# Patient Record
Sex: FEMALE | Race: WHITE
[De-identification: names, ages, dates, MRNs, and addresses within clinical notes are randomized per-mention and may not be internally consistent; named-entity substitution may affect disease eponyms.]

---
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
## 2023-01-06 ENCOUNTER — OFFICE VISIT (OUTPATIENT)
Dept: FAMILY MEDICINE CLINIC | Facility: CLINIC | Age: 18
End: 2023-01-06

## 2023-01-06 VITALS
WEIGHT: 103.6 LBS | TEMPERATURE: 98.3 F | DIASTOLIC BLOOD PRESSURE: 78 MMHG | OXYGEN SATURATION: 90 % | RESPIRATION RATE: 16 BRPM | HEART RATE: 76 BPM | SYSTOLIC BLOOD PRESSURE: 117 MMHG | BODY MASS INDEX: 17.4 KG/M2

## 2023-01-06 DIAGNOSIS — F32.A DEPRESSION, UNSPECIFIED DEPRESSION TYPE: ICD-10-CM

## 2023-01-06 DIAGNOSIS — N92.6 IRREGULAR MENSTRUAL CYCLE: ICD-10-CM

## 2023-01-06 DIAGNOSIS — R63.4 WEIGHT LOSS: Primary | ICD-10-CM

## 2023-01-06 DIAGNOSIS — R11.0 NAUSEA: ICD-10-CM

## 2023-01-06 RX ORDER — SERTRALINE HYDROCHLORIDE 25 MG/1
25 TABLET, FILM COATED ORAL DAILY
Qty: 90 TABLET | Refills: 3 | Status: ON HOLD | OUTPATIENT
Start: 2023-01-06

## 2023-01-06 RX ORDER — SERTRALINE HYDROCHLORIDE 25 MG/1
25 TABLET, FILM COATED ORAL DAILY
Qty: 90 TABLET | Refills: 3 | Status: SHIPPED | OUTPATIENT
Start: 2023-01-06 | End: 2023-01-06

## 2023-01-06 RX ORDER — ONDANSETRON 4 MG/1
4 TABLET, FILM COATED ORAL EVERY 8 HOURS PRN
Qty: 20 TABLET | Refills: 0 | Status: ON HOLD | OUTPATIENT
Start: 2023-01-06

## 2023-01-06 NOTE — PROGRESS NOTES
Name: Brady Anand      : 2005      MRN: [id number]  Encounter Provider: Rain Nunez DO  Encounter Date: 2023   Encounter department: [address] [place] [address]  Weight loss  Assessment & Plan:  Wt Readings from Last 3 Encounters:   23 47 kg (103 lb 9 6 oz) (7 %, Z= -1 48)*   22 53 kg (116 lb 12 8 oz) (30 %, Z= -0 54)*   22 54 kg (119 lb) (37 %, Z= -0 33)*     * Growth percentiles are based on [facility] (Girls, 2-20 Years) data  15yo female presenting for follow up regarding weight loss  Pt has lost weight since last visit  Small portions eaten per mom, nausea with eating  Reports increased stress lately  Plan:  - Will order medical workup to rule out medical cause - CBC, TSH, CXR  - Will refer to nutrition services  - Will prescribe trial of zofran to assist with nausea  - Recommend protein shakes, full-fat options  - F/u 2 weeks for weight check    Orders:  -     CBC and differential; Future  -     XR chest pa & lateral; Future; Expected date: 2023  -     Ambulatory Referral to Nutrition Services; Future    2  Depression, unspecified depression type  Assessment & Plan:  15yo female presenting for f/u visit regarding weight loss  Of note, pt has not been taking her Lexapro for depression  Did not establish for behavioral therapy  Plan:  - Will switch to zoloft, f/u in 2 weeks  - Will contact Dr Skye Mendez for scheduling for behavioral therapy    Orders:  -     sertraline (Zoloft) 25 mg tablet; Take 1 tablet (25 mg total) by mouth daily    3  Irregular menstrual cycle  Assessment & Plan:  15yo female presenting for f/u visit regarding irregular menstrual periods  Heavy menstrual bleeding 2-3 weeks with 3 days in between    - Nexplanon placed 2022 with no regulation of cycles  - Transvaginal ultrasound unremarkable    Plan:  - Will order CBC  - Will refer to endocrinology for a specialty evaluation  - F/u 2 weeks    Orders:  -     Ambulatory Referral to Endocrinology; Future    4  Nausea  -     ondansetron (ZOFRAN) 4 mg tablet; Take 1 tablet (4 mg total) by mouth every 8 (eight) hours as needed for nausea or vomiting         Subjective      18yo female presents for follow up visit with Mom via Facetime  Weight loss  - Mom reports pt has lost weight since her last visit  - Reports poor appetite or small portions when she does eat, reports she becomes nauseous with eating  - Pt reports keeping hydrated  - Pt admits to associated stress - boyfriend moved out of her house  - Pt reports she is upset that she cannot get a job  Reports cannot stand for long due to dizziness  Reports she has passed out in the past     Depression  - Recent stress as stated above  - Has not been taking her Lexapro, feels like she is numb and cannot feel happy       Review of Systems   Constitutional: Positive for appetite change and unexpected weight change  Negative for chills and fever  HENT: Negative for congestion  Eyes: Negative for visual disturbance  Respiratory: Negative for shortness of breath  Cardiovascular: Negative for chest pain and palpitations  Gastrointestinal: Positive for nausea  Negative for abdominal pain and vomiting  Allergic/Immunologic: Negative for environmental allergies and food allergies  Neurological: Positive for dizziness, syncope and light-headedness  Negative for weakness and headaches  Psychiatric/Behavioral: Negative for sleep disturbance  Current Outpatient Medications on File Prior to Visit   Medication Sig   • [DISCONTINUED] escitalopram (LEXAPRO) 10 mg tablet Take 10 mg by mouth daily (Patient not taking: Reported on 1/6/2023)       Objective     /78   Pulse 76   Temp 98 3 °F (36 8 °C)   Resp 16   Wt 47 kg (103 lb 9 6 oz)   SpO2 90%   BMI 17 40 kg/m²     Physical Exam  Constitutional:       General: She is not in acute distress  Appearance: She is ill-appearing  Comments: Pt appears underweight   HENT:      Head: Normocephalic and atraumatic  Cardiovascular:      Rate and Rhythm: Normal rate and regular rhythm  Heart sounds: Normal heart sounds  Pulmonary:      Effort: Pulmonary effort is normal       Breath sounds: Normal breath sounds  No wheezing or rales  Abdominal:      General: Bowel sounds are normal       Palpations: Abdomen is soft  Tenderness: There is no abdominal tenderness  There is no guarding or rebound  Musculoskeletal:         General: No swelling or deformity  Skin:     General: Skin is warm and dry     Psychiatric:         Mood and Affect: Mood normal          Behavior: Behavior normal           Farzaneh Guerra DO

## 2023-01-07 NOTE — ASSESSMENT & PLAN NOTE
Wt Readings from Last 3 Encounters:   01/06/23 47 kg (103 lb 9 6 oz) (7 %, Z= -1 48)*   11/04/22 53 kg (116 lb 12 8 oz) (30 %, Z= -0 54)*   03/23/22 54 kg (119 lb) (37 %, Z= -0 33)*     * Growth percentiles are based on [facility] (Girls, 2-20 Years) data  15yo female presenting for follow up regarding weight loss  Pt has lost weight since last visit  Small portions eaten per mom, nausea with eating  Reports increased stress lately      Plan:  - Will order medical workup to rule out medical cause - CBC, TSH, CXR  - Will refer to nutrition services  - Will prescribe trial of zofran to assist with nausea  - Recommend protein shakes, full-fat options  - F/u 2 weeks for weight check

## 2023-01-07 NOTE — ASSESSMENT & PLAN NOTE
17yo female presenting for f/u visit regarding weight loss  Of note, pt has not been taking her Lexapro for depression  Did not establish for behavioral therapy      Plan:  - Will switch to zoloft, f/u in 2 weeks  - Will contact Dr Thad Dean for scheduling for behavioral therapy

## 2023-01-07 NOTE — ASSESSMENT & PLAN NOTE
17yo female presenting for f/u visit regarding irregular menstrual periods  Heavy menstrual bleeding 2-3 weeks with 3 days in between    - Nexplanon placed 1/2022 with no regulation of cycles  - Transvaginal ultrasound unremarkable    Plan:  - Will order CBC  - Will refer to endocrinology for a specialty evaluation  - F/u 2 weeks

## 2023-01-14 ENCOUNTER — HOSPITAL ENCOUNTER (INPATIENT)
Facility: HOSPITAL | Age: 18
LOS: 4 days | Discharge: HOME | End: 2023-01-18
Attending: STUDENT IN AN ORGANIZED HEALTH CARE EDUCATION/TRAINING PROGRAM | Admitting: STUDENT IN AN ORGANIZED HEALTH CARE EDUCATION/TRAINING PROGRAM

## 2023-01-14 ENCOUNTER — HOSPITAL ENCOUNTER (EMERGENCY)
Facility: HOSPITAL | Age: 18
End: 2023-01-14
Attending: EMERGENCY MEDICINE

## 2023-01-14 VITALS
TEMPERATURE: 98.2 F | DIASTOLIC BLOOD PRESSURE: 65 MMHG | HEART RATE: 78 BPM | SYSTOLIC BLOOD PRESSURE: 102 MMHG | OXYGEN SATURATION: 99 % | RESPIRATION RATE: 18 BRPM

## 2023-01-14 DIAGNOSIS — F32.9 MDD (MAJOR DEPRESSIVE DISORDER): Primary | ICD-10-CM

## 2023-01-14 DIAGNOSIS — F32.9 MDD (MAJOR DEPRESSIVE DISORDER): ICD-10-CM

## 2023-01-14 DIAGNOSIS — F32.A DEPRESSION, UNSPECIFIED DEPRESSION TYPE: ICD-10-CM

## 2023-01-14 DIAGNOSIS — F33.2 MDD (MAJOR DEPRESSIVE DISORDER), RECURRENT EPISODE, SEVERE (HCC): Primary | ICD-10-CM

## 2023-01-14 DIAGNOSIS — R82.90 ABNORMAL URINALYSIS: ICD-10-CM

## 2023-01-14 LAB
AMPHETAMINES SERPL QL SCN: NEGATIVE
BARBITURATES UR QL: NEGATIVE
BENZODIAZ UR QL: NEGATIVE
COCAINE UR QL: NEGATIVE
ETHANOL EXG-MCNC: 0 MG/DL
EXT PREGNANCY TEST URINE: NEGATIVE
EXT. CONTROL: NORMAL
FLUAV RNA RESP QL NAA+PROBE: NEGATIVE
FLUBV RNA RESP QL NAA+PROBE: NEGATIVE
METHADONE UR QL: NEGATIVE
OPIATES UR QL SCN: NEGATIVE
OXYCODONE+OXYMORPHONE UR QL SCN: NEGATIVE
PCP UR QL: NEGATIVE
RSV RNA RESP QL NAA+PROBE: NEGATIVE
SARS-COV-2 RNA RESP QL NAA+PROBE: NEGATIVE
THC UR QL: POSITIVE

## 2023-01-14 RX ORDER — OLANZAPINE 5 MG/1
5 TABLET ORAL
Status: DISCONTINUED | OUTPATIENT
Start: 2023-01-14 | End: 2023-01-18 | Stop reason: HOSPADM

## 2023-01-14 RX ORDER — BENZTROPINE MESYLATE 1 MG/ML
1 INJECTION INTRAMUSCULAR; INTRAVENOUS
Status: DISCONTINUED | OUTPATIENT
Start: 2023-01-14 | End: 2023-01-18 | Stop reason: HOSPADM

## 2023-01-14 RX ORDER — OLANZAPINE 2.5 MG/1
2.5 TABLET ORAL
Status: CANCELLED | OUTPATIENT
Start: 2023-01-14

## 2023-01-14 RX ORDER — ACETAMINOPHEN 325 MG/1
975 TABLET ORAL EVERY 6 HOURS PRN
Status: DISCONTINUED | OUTPATIENT
Start: 2023-01-14 | End: 2023-01-18 | Stop reason: HOSPADM

## 2023-01-14 RX ORDER — HYDROXYZINE 50 MG/1
100 TABLET, FILM COATED ORAL
Status: DISCONTINUED | OUTPATIENT
Start: 2023-01-14 | End: 2023-01-18 | Stop reason: HOSPADM

## 2023-01-14 RX ORDER — BISACODYL 10 MG
10 SUPPOSITORY, RECTAL RECTAL DAILY PRN
Status: CANCELLED | OUTPATIENT
Start: 2023-01-14

## 2023-01-14 RX ORDER — OLANZAPINE 2.5 MG/1
5 TABLET ORAL
Status: CANCELLED | OUTPATIENT
Start: 2023-01-14

## 2023-01-14 RX ORDER — BENZTROPINE MESYLATE 0.5 MG/1
1 TABLET ORAL
Status: CANCELLED | OUTPATIENT
Start: 2023-01-14

## 2023-01-14 RX ORDER — ACETAMINOPHEN 325 MG/1
650 TABLET ORAL EVERY 4 HOURS PRN
Status: CANCELLED | OUTPATIENT
Start: 2023-01-14

## 2023-01-14 RX ORDER — MAGNESIUM HYDROXIDE/ALUMINUM HYDROXICE/SIMETHICONE 120; 1200; 1200 MG/30ML; MG/30ML; MG/30ML
30 SUSPENSION ORAL EVERY 4 HOURS PRN
Status: DISCONTINUED | OUTPATIENT
Start: 2023-01-14 | End: 2023-01-18 | Stop reason: HOSPADM

## 2023-01-14 RX ORDER — ACETAMINOPHEN 325 MG/1
650 TABLET ORAL EVERY 6 HOURS PRN
Status: CANCELLED | OUTPATIENT
Start: 2023-01-14

## 2023-01-14 RX ORDER — DIPHENHYDRAMINE HYDROCHLORIDE 50 MG/ML
50 INJECTION INTRAMUSCULAR; INTRAVENOUS EVERY 6 HOURS PRN
Status: CANCELLED | OUTPATIENT
Start: 2023-01-14

## 2023-01-14 RX ORDER — ACETAMINOPHEN 325 MG/1
650 TABLET ORAL EVERY 4 HOURS PRN
Status: DISCONTINUED | OUTPATIENT
Start: 2023-01-14 | End: 2023-01-18 | Stop reason: HOSPADM

## 2023-01-14 RX ORDER — HYDROXYZINE HYDROCHLORIDE 25 MG/1
50 TABLET, FILM COATED ORAL
Status: CANCELLED | OUTPATIENT
Start: 2023-01-14

## 2023-01-14 RX ORDER — POLYETHYLENE GLYCOL 3350 17 G/17G
17 POWDER, FOR SOLUTION ORAL DAILY PRN
Status: DISCONTINUED | OUTPATIENT
Start: 2023-01-14 | End: 2023-01-18 | Stop reason: HOSPADM

## 2023-01-14 RX ORDER — LORAZEPAM 2 MG/ML
2 INJECTION INTRAMUSCULAR EVERY 6 HOURS PRN
Status: CANCELLED | OUTPATIENT
Start: 2023-01-14

## 2023-01-14 RX ORDER — OLANZAPINE 10 MG/1
2.5 INJECTION, POWDER, LYOPHILIZED, FOR SOLUTION INTRAMUSCULAR
Status: DISCONTINUED | OUTPATIENT
Start: 2023-01-14 | End: 2023-01-18 | Stop reason: HOSPADM

## 2023-01-14 RX ORDER — ONDANSETRON 4 MG/1
4 TABLET, ORALLY DISINTEGRATING ORAL ONCE
Status: COMPLETED | OUTPATIENT
Start: 2023-01-14 | End: 2023-01-14

## 2023-01-14 RX ORDER — LANOLIN ALCOHOL/MO/W.PET/CERES
3 CREAM (GRAM) TOPICAL
Status: DISCONTINUED | OUTPATIENT
Start: 2023-01-14 | End: 2023-01-18 | Stop reason: HOSPADM

## 2023-01-14 RX ORDER — LORAZEPAM 2 MG/ML
2 INJECTION INTRAMUSCULAR EVERY 6 HOURS PRN
Status: DISCONTINUED | OUTPATIENT
Start: 2023-01-14 | End: 2023-01-18 | Stop reason: HOSPADM

## 2023-01-14 RX ORDER — MIRTAZAPINE 15 MG/1
7.5 TABLET, FILM COATED ORAL
Status: CANCELLED | OUTPATIENT
Start: 2023-01-14

## 2023-01-14 RX ORDER — ACETAMINOPHEN 325 MG/1
650 TABLET ORAL EVERY 6 HOURS PRN
Status: DISCONTINUED | OUTPATIENT
Start: 2023-01-14 | End: 2023-01-18 | Stop reason: HOSPADM

## 2023-01-14 RX ORDER — ACETAMINOPHEN 325 MG/1
975 TABLET ORAL EVERY 6 HOURS PRN
Status: CANCELLED | OUTPATIENT
Start: 2023-01-14

## 2023-01-14 RX ORDER — POLYETHYLENE GLYCOL 3350 17 G/17G
17 POWDER, FOR SOLUTION ORAL DAILY PRN
Status: CANCELLED | OUTPATIENT
Start: 2023-01-14

## 2023-01-14 RX ORDER — HYDROXYZINE HYDROCHLORIDE 25 MG/1
100 TABLET, FILM COATED ORAL
Status: CANCELLED | OUTPATIENT
Start: 2023-01-14

## 2023-01-14 RX ORDER — OLANZAPINE 10 MG/1
5 INJECTION, POWDER, LYOPHILIZED, FOR SOLUTION INTRAMUSCULAR
Status: DISCONTINUED | OUTPATIENT
Start: 2023-01-14 | End: 2023-01-18 | Stop reason: HOSPADM

## 2023-01-14 RX ORDER — OLANZAPINE 10 MG/1
2.5 INJECTION, POWDER, LYOPHILIZED, FOR SOLUTION INTRAMUSCULAR
Status: CANCELLED | OUTPATIENT
Start: 2023-01-14

## 2023-01-14 RX ORDER — LANOLIN ALCOHOL/MO/W.PET/CERES
3 CREAM (GRAM) TOPICAL
Status: CANCELLED | OUTPATIENT
Start: 2023-01-14

## 2023-01-14 RX ORDER — DIPHENHYDRAMINE HYDROCHLORIDE 50 MG/ML
50 INJECTION INTRAMUSCULAR; INTRAVENOUS EVERY 6 HOURS PRN
Status: DISCONTINUED | OUTPATIENT
Start: 2023-01-14 | End: 2023-01-18 | Stop reason: HOSPADM

## 2023-01-14 RX ORDER — OLANZAPINE 2.5 MG/1
2.5 TABLET ORAL
Status: DISCONTINUED | OUTPATIENT
Start: 2023-01-14 | End: 2023-01-18 | Stop reason: HOSPADM

## 2023-01-14 RX ORDER — PROPRANOLOL HYDROCHLORIDE 20 MG/1
10 TABLET ORAL EVERY 8 HOURS PRN
Status: CANCELLED | OUTPATIENT
Start: 2023-01-14

## 2023-01-14 RX ORDER — BISACODYL 10 MG
10 SUPPOSITORY, RECTAL RECTAL DAILY PRN
Status: DISCONTINUED | OUTPATIENT
Start: 2023-01-14 | End: 2023-01-18 | Stop reason: HOSPADM

## 2023-01-14 RX ORDER — BENZTROPINE MESYLATE 1 MG/ML
1 INJECTION INTRAMUSCULAR; INTRAVENOUS
Status: CANCELLED | OUTPATIENT
Start: 2023-01-14

## 2023-01-14 RX ORDER — TRAZODONE HYDROCHLORIDE 50 MG/1
50 TABLET ORAL
Status: DISCONTINUED | OUTPATIENT
Start: 2023-01-14 | End: 2023-01-14

## 2023-01-14 RX ORDER — OLANZAPINE 10 MG/1
5 INJECTION, POWDER, LYOPHILIZED, FOR SOLUTION INTRAMUSCULAR
Status: CANCELLED | OUTPATIENT
Start: 2023-01-14

## 2023-01-14 RX ORDER — HYDROXYZINE HYDROCHLORIDE 25 MG/1
25 TABLET, FILM COATED ORAL
Status: CANCELLED | OUTPATIENT
Start: 2023-01-14

## 2023-01-14 RX ORDER — AMOXICILLIN 250 MG
1 CAPSULE ORAL DAILY PRN
Status: CANCELLED | OUTPATIENT
Start: 2023-01-14

## 2023-01-14 RX ORDER — MAGNESIUM HYDROXIDE/ALUMINUM HYDROXICE/SIMETHICONE 120; 1200; 1200 MG/30ML; MG/30ML; MG/30ML
30 SUSPENSION ORAL EVERY 4 HOURS PRN
Status: CANCELLED | OUTPATIENT
Start: 2023-01-14

## 2023-01-14 RX ORDER — MIRTAZAPINE 15 MG/1
7.5 TABLET, FILM COATED ORAL
Status: DISCONTINUED | OUTPATIENT
Start: 2023-01-14 | End: 2023-01-15

## 2023-01-14 RX ORDER — HYDROXYZINE 50 MG/1
50 TABLET, FILM COATED ORAL
Status: DISCONTINUED | OUTPATIENT
Start: 2023-01-14 | End: 2023-01-18 | Stop reason: HOSPADM

## 2023-01-14 RX ORDER — BENZTROPINE MESYLATE 1 MG/1
1 TABLET ORAL
Status: DISCONTINUED | OUTPATIENT
Start: 2023-01-14 | End: 2023-01-18 | Stop reason: HOSPADM

## 2023-01-14 RX ORDER — PROPRANOLOL HYDROCHLORIDE 10 MG/1
10 TABLET ORAL EVERY 8 HOURS PRN
Status: DISCONTINUED | OUTPATIENT
Start: 2023-01-14 | End: 2023-01-18 | Stop reason: HOSPADM

## 2023-01-14 RX ORDER — AMOXICILLIN 250 MG
1 CAPSULE ORAL DAILY PRN
Status: DISCONTINUED | OUTPATIENT
Start: 2023-01-14 | End: 2023-01-18 | Stop reason: HOSPADM

## 2023-01-14 RX ORDER — HYDROXYZINE HYDROCHLORIDE 25 MG/1
25 TABLET, FILM COATED ORAL
Status: DISCONTINUED | OUTPATIENT
Start: 2023-01-14 | End: 2023-01-18 | Stop reason: HOSPADM

## 2023-01-14 RX ADMIN — ONDANSETRON 4 MG: 4 TABLET, ORALLY DISINTEGRATING ORAL at 02:16

## 2023-01-14 RX ADMIN — ONDANSETRON 4 MG: 4 TABLET, ORALLY DISINTEGRATING ORAL at 14:11

## 2023-01-14 RX ADMIN — Medication 3 MG: at 21:17
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
## 2023-01-14 NOTE — ED NOTES
Pts cell phone put in locker 20  All other belongings sent home with parents        Jasbir Shah RN  01/14/23 2307
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
## 2023-01-14 NOTE — NURSING NOTE
Patient arrived wearin pr  Panties, 1 Bra  1 pr  White Crocs    To Locker:  1 large Yellow/Orange blanket  1 large Stuffed Cow    Paper with Mom/Dad phone numbers given to patient

## 2023-01-14 NOTE — ED ATTENDING ATTESTATION
1/14/2023  I, Manjula Duffy MD, saw and evaluated the patient  I have discussed the patient with the resident/non-physician practitioner and agree with the resident's/non-physician practitioner's findings, Plan of Care, and MDM as documented in the resident's/non-physician practitioner's note, except where noted  All available labs and Radiology studies were reviewed  I was present for key portions of any procedure(s) performed by the resident/non-physician practitioner and I was immediately available to provide assistance  At this point I agree with the current assessment done in the Emergency Department  I have conducted an independent evaluation of this patient a history and physical is as follows:    ED Course         Critical Care Time  Procedures    Patient is a pleasant 21 yof who presents with si  No HI  No focal neuro symptoms  No chest pain or sob  Notes a history of psychiatric issues in the past      MDM pleasant 21 yof, SI, will admit

## 2023-01-14 NOTE — NURSING NOTE
Pt is a 89 from [facility] with depression, anxiety, racing thoughts and SI  Pt recently started on Zoloft and Zofran, prescribed by PCP 4 days PTA  Poor sleep, appetite, increased nausea with ~20lb weight loss over the last 3 months  Pt denies any hx of IP treatment or SA  Currently denies SI, reports elevated anxiety related to 1st IP admission  Discussed unit expectations, schedule, treatment team members

## 2023-01-14 NOTE — ED NOTES
Patient is accepted at [facility]  Patient is accepted by Dr oCnor Del Toro per [address]  [facility] is arranged with [facility]  Transportation is scheduled for [phone]  Patient may go to the floor upon arrival        Nurse report is to be called to [phone] prior to patient transfer
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
## 2023-01-14 NOTE — PLAN OF CARE
RN will meet with pt at least twice per day to assess for any concerns  Teach about prescribed medications and diagnosis  Pt will be taught and encouraged to utilize healthy coping skills

## 2023-01-14 NOTE — LETTER
[address] [address]  Dept: [phone]      EMTALA TRANSFER CONSENT    NAME Kika Ro                                         2005                              MRN [id number]    I have been informed of my rights regarding examination, treatment, and transfer   by Dr Camilo Varela MD    Benefits: Specialized equipment and/or services available at the receiving facility (Include comment)________________________ [facility])    Risks: Potential for delay in receiving treatment      Transfer Request   I acknowledge that my medical condition has been evaluated and explained to me by the emergency department physician or other qualified medical person and/or my attending physician who has recommended and offered to me further medical examination and treatment  I understand the Hospital's obligation with respect to the treatment and stabilization of my emergency medical condition  I nevertheless request to be transferred  I release the Hospital, the doctor, and any other persons caring for me from all responsibility or liability for any injury or ill effects that may result from my transfer and agree to accept all responsibility for the consequences of my choice to transfer, rather than receive stabilizing treatment at the Hospital  I understand that because the transfer is my request, my insurance may not provide reimbursement for the services  The Hospital will assist and direct me and my family in how to make arrangements for transfer, but the hospital is not liable for any fees charged by the transport service  In spite of this understanding, I refuse to consent to further medical examination and treatment which has been offered to me, and request transfer to  [address] Name, [facility] & [place] : [place]   I authorize the performance of emergency medical procedures and treatments upon me in both transit and upon arrival at the receiving facility  Additionally, I authorize the release of any and all medical records to the receiving facility and request they be transported with me, if possible  I authorize the performance of emergency medical procedures and treatments upon me in both transit and upon arrival at the receiving facility  Additionally, I authorize the release of any and all medical records to the receiving facility and request they be transported with me, if possible  I understand that the safest mode of transportation during a medical emergency is an ambulance and that the Hospital advocates the use of this mode of transport  Risks of traveling to the receiving facility by car, including absence of medical control, life sustaining equipment, such as oxygen, and medical personnel has been explained to me and I fully understand them  (KAM CORRECT BOX BELOW)  [  ]  I consent to the stated transfer and to be transported by ambulance/helicopter  [  ]  I consent to the stated transfer, but refuse transportation by ambulance and accept full responsibility for my transportation by car  I understand the risks of non-ambulance transfers and I exonerate the Hospital and its staff from any deterioration in my condition that results from this refusal     X___________________________________________    DATE  23  TIME________  Signature of patient or legally responsible individual signing on patient behalf           RELATIONSHIP TO PATIENT_________________________          Provider Certification    NAME Pastor Fernandes                                        SH[place] 2005                              MRN [id number]    A medical screening exam was performed on the above named patient  Based on the examination:    Condition Necessitating Transfer The encounter diagnosis was MDD (major depressive disorder)      Patient Condition: The patient has been stabilized such that within reasonable medical probability, no material deterioration of the patient condition or the condition of the unborn child(enio) is likely to result from the transfer    Reason for Transfer: Level of Care needed not available at this facility (Inpatient Psych)    Transfer Requirements: [facility] [place] [facility]   · Space available and qualified personnel available for treatment as acknowledged by [phone]  · Agreed to accept transfer and to provide appropriate medical treatment as acknowledged by       Dr Yancy Dickson  · Appropriate medical records of the examination and treatment of the patient are provided at the time of transfer   [address] [facility], [address] _______  · Transfer will be performed by qualified personnel from [address] [place] [address]  and appropriate transfer equipment as required, including the use of necessary and appropriate life support measures  Provider Certification: I have examined the patient and explained the following risks and benefits of being transferred/refusing transfer to the patient/family:  General risk, such as traffic hazards, adverse weather conditions, rough terrain or turbulence, possible failure of equipment (including vehicle or aircraft), or consequences of actions of persons outside the control of the transport personnel      Based on these reasonable risks and benefits to the patient and/or the unborn child(enio), and based upon the information available at the time of the patient’s examination, I certify that the medical benefits reasonably to be expected from the provision of appropriate medical treatments at another medical facility outweigh the increasing risks, if any, to the individual’s medical condition, and in the case of labor to the unborn child, from effecting the transfer      X____________________________________________ DATE 01/14/23        TIME_______      ORIGINAL - SEND TO MEDICAL RECORDS   COPY - SEND WITH PATIENT DURING TRANSFER

## 2023-01-14 NOTE — ED NOTES
Lunch tray ordered for pt  Pt aware of signed 61 51 81, and aware of process finding appropriate bed  Other comfort needs offered, declined at this time  Claudia Headings EDT on 1:1, parents at bedside         Pravin Keating RN  01/14/23 2752
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
## 2023-01-14 NOTE — ED ATTENDING ATTESTATION
1/14/2023  I, Fabrizio Talavera MD, saw and evaluated the patient  I have discussed the patient with the resident/non-physician practitioner and agree with the resident's/non-physician practitioner's findings, Plan of Care, and MDM as documented in the resident's/non-physician practitioner's note, except where noted  All available labs and Radiology studies were reviewed  I was present for key portions of any procedure(s) performed by the resident/non-physician practitioner and I was immediately available to provide assistance  At this point I agree with the current assessment done in the Emergency Department  I have conducted an independent evaluation of this patient a history and physical is as follows:    Patient has been medically cleared by previous team and is awaiting crisis evaluation  Patient seen and evaluated by crisis worker, 12 signed  Patient accepted at [facility]      ED Course         Critical Care Time  Procedures

## 2023-01-15 PROBLEM — F41.9 ANXIETY: Status: ACTIVE | Noted: 2023-01-15

## 2023-01-15 PROBLEM — R82.90 ABNORMAL URINALYSIS: Status: ACTIVE | Noted: 2023-01-15

## 2023-01-15 PROBLEM — F33.2 MDD (MAJOR DEPRESSIVE DISORDER), RECURRENT EPISODE, SEVERE (HCC): Status: ACTIVE | Noted: 2022-11-04

## 2023-01-15 PROBLEM — F40.10 SOCIAL ANXIETY DISORDER: Status: ACTIVE | Noted: 2023-01-15

## 2023-01-15 PROBLEM — Z00.8 MEDICAL CLEARANCE FOR PSYCHIATRIC ADMISSION: Status: ACTIVE | Noted: 2023-01-15

## 2023-01-15 LAB
25(OH)D3 SERPL-MCNC: 10.9 NG/ML (ref 30–100)
ALBUMIN SERPL BCP-MCNC: 3.9 G/DL (ref 3.5–5)
ALP SERPL-CCNC: 76 U/L (ref 46–384)
ALT SERPL W P-5'-P-CCNC: 16 U/L (ref 12–78)
ANION GAP SERPL CALCULATED.3IONS-SCNC: 6 MMOL/L (ref 4–13)
AST SERPL W P-5'-P-CCNC: 12 U/L (ref 5–45)
ATRIAL RATE: 55 BPM
BACTERIA UR QL AUTO: ABNORMAL /HPF
BASOPHILS # BLD AUTO: 0.05 THOUSANDS/ÂΜL (ref 0–0.1)
BASOPHILS NFR BLD AUTO: 1 % (ref 0–1)
BILIRUB SERPL-MCNC: 0.4 MG/DL (ref 0.2–1)
BILIRUB UR QL STRIP: NEGATIVE
BUN SERPL-MCNC: 10 MG/DL (ref 5–25)
CALCIUM SERPL-MCNC: 8.7 MG/DL (ref 8.3–10.1)
CAOX CRY URNS QL MICRO: ABNORMAL /HPF
CHLORIDE SERPL-SCNC: 108 MMOL/L (ref 96–108)
CHOLEST SERPL-MCNC: 102 MG/DL
CLARITY UR: ABNORMAL
CO2 SERPL-SCNC: 27 MMOL/L (ref 21–32)
COLOR UR: ABNORMAL
CREAT SERPL-MCNC: 0.78 MG/DL (ref 0.6–1.3)
EOSINOPHIL # BLD AUTO: 0.09 THOUSAND/ÂΜL (ref 0–0.61)
EOSINOPHIL NFR BLD AUTO: 2 % (ref 0–6)
ERYTHROCYTE [DISTWIDTH] IN BLOOD BY AUTOMATED COUNT: 12 % (ref 11.6–15.1)
GFR SERPL CREATININE-BSD FRML MDRD: 110 ML/MIN/1.73SQ M
GLUCOSE P FAST SERPL-MCNC: 84 MG/DL (ref 65–99)
GLUCOSE SERPL-MCNC: 84 MG/DL (ref 65–140)
GLUCOSE UR STRIP-MCNC: NEGATIVE MG/DL
HCT VFR BLD AUTO: 40.5 % (ref 34.8–46.1)
HDLC SERPL-MCNC: 46 MG/DL
HGB BLD-MCNC: 13.1 G/DL (ref 11.5–15.4)
HGB UR QL STRIP.AUTO: NEGATIVE
IMM GRANULOCYTES # BLD AUTO: 0.01 THOUSAND/UL (ref 0–0.2)
IMM GRANULOCYTES NFR BLD AUTO: 0 % (ref 0–2)
KETONES UR STRIP-MCNC: NEGATIVE MG/DL
LDLC SERPL CALC-MCNC: 50 MG/DL (ref 0–100)
LEUKOCYTE ESTERASE UR QL STRIP: ABNORMAL
LYMPHOCYTES # BLD AUTO: 2.81 THOUSANDS/ÂΜL (ref 0.6–4.47)
LYMPHOCYTES NFR BLD AUTO: 53 % (ref 14–44)
MAGNESIUM SERPL-MCNC: 2.1 MG/DL (ref 1.6–2.6)
MCH RBC QN AUTO: 30.3 PG (ref 26.8–34.3)
MCHC RBC AUTO-ENTMCNC: 32.3 G/DL (ref 31.4–37.4)
MCV RBC AUTO: 94 FL (ref 82–98)
MONOCYTES # BLD AUTO: 0.48 THOUSAND/ÂΜL (ref 0.17–1.22)
MONOCYTES NFR BLD AUTO: 9 % (ref 4–12)
MUCOUS THREADS UR QL AUTO: ABNORMAL
NEUTROPHILS # BLD AUTO: 1.87 THOUSANDS/ÂΜL (ref 1.85–7.62)
NEUTS SEG NFR BLD AUTO: 35 % (ref 43–75)
NITRITE UR QL STRIP: NEGATIVE
NON-SQ EPI CELLS URNS QL MICRO: ABNORMAL /HPF
NONHDLC SERPL-MCNC: 56 MG/DL
NRBC BLD AUTO-RTO: 0 /100 WBCS
P AXIS: 63 DEGREES
PH UR STRIP.AUTO: 6 [PH]
PHOSPHATE SERPL-MCNC: 4.7 MG/DL (ref 2.7–4.5)
PLATELET # BLD AUTO: 209 THOUSANDS/UL (ref 149–390)
PMV BLD AUTO: 10.6 FL (ref 8.9–12.7)
POTASSIUM SERPL-SCNC: 3.7 MMOL/L (ref 3.5–5.3)
PR INTERVAL: 140 MS
PROT SERPL-MCNC: 6.5 G/DL (ref 6.4–8.4)
PROT UR STRIP-MCNC: ABNORMAL MG/DL
QRS AXIS: 84 DEGREES
QRSD INTERVAL: 94 MS
QT INTERVAL: 414 MS
QTC INTERVAL: 396 MS
RBC # BLD AUTO: 4.32 MILLION/UL (ref 3.81–5.12)
RBC #/AREA URNS AUTO: ABNORMAL /HPF
SODIUM SERPL-SCNC: 141 MMOL/L (ref 135–147)
SP GR UR STRIP.AUTO: >=1.03 (ref 1–1.03)
T WAVE AXIS: 59 DEGREES
TRIGL SERPL-MCNC: 28 MG/DL
TSH SERPL DL<=0.05 MIU/L-ACNC: 0.86 UIU/ML (ref 0.45–4.5)
UROBILINOGEN UR STRIP-ACNC: 2 MG/DL
VENTRICULAR RATE: 55 BPM
WBC # BLD AUTO: 5.31 THOUSAND/UL (ref 4.31–10.16)
WBC #/AREA URNS AUTO: ABNORMAL /HPF

## 2023-01-15 RX ORDER — MIRTAZAPINE 15 MG/1
15 TABLET, FILM COATED ORAL
Status: DISCONTINUED | OUTPATIENT
Start: 2023-01-15 | End: 2023-01-18 | Stop reason: HOSPADM

## 2023-01-15 RX ADMIN — MIRTAZAPINE 15 MG: 15 TABLET, FILM COATED ORAL at 21:23

## 2023-01-15 RX ADMIN — Medication 3 MG: at 21:23

## 2023-01-15 RX ADMIN — INFLUENZA VIRUS VACCINE 0.5 ML: 15; 15; 15; 15 SUSPENSION INTRAMUSCULAR at 09:24

## 2023-01-15 NOTE — NURSING NOTE
M[place] from [facility]    Approved for 4 days 1/14/23- 1/17/23, for review on 1/17/23  Auth #: [id number]

## 2023-01-15 NOTE — ASSESSMENT & PLAN NOTE
· Grossly positive however, patient does not complain of any symptoms  · If patient does develop symptoms, treat with 3 days of Bactrim DS q12  · Cultures ordered today

## 2023-01-15 NOTE — TREATMENT PLAN
TREATMENT PLAN REVIEW - [address] 17 y o  2005 female MRN: [id number]    [address] [place] [address] Room / Bed: [facility] 258/[facility] 281-00 Encounter: [id number]          Admit Date/Time:  1/14/2023  4:52 PM    Treatment Team: Attending Provider: Vidhya Nobles MD; Patient Care Assistant: Scot Langford; Registered Nurse: Terrell Whittington RN (Inactive); Registered Nurse: Amee Chapman, NOBLE; Patient Care Assistant: Dawti Harris; Security: Elizabeth Slice; Nursing Student: Harshad Watson; Patient Care Assistant: Alonzo Alanis;  Charge Nurse: Oneida Crisostomo RN; Registered Nurse: Luis Felipe Herman RN; Physician Assistant: Caleb Cheung PA-C    Diagnosis: Principal Problem:    MDD (major depressive disorder), recurrent episode, severe ([facility] )  Active Problems:    Anxiety    Social anxiety disorder      Patient Strengths/Assets: ability for insight, average or above intelligence, cooperative, family ties, motivation for treatment/growth    Patient Barriers/Limitations: difficulty adapting, marital/family conflict, noncompliant with medication    Short Term Goals: decrease in depressive symptoms, decrease in anxiety symptoms, decrease in suicidal thoughts, decrease in self abusive behaviors, improvement in insight, improvement in reality testing, improvement in reasoning ability, improvement in self care    Long Term Goals: improvement in depression, improvement in anxiety, free of suicidal thoughts, no self abusive behavior, acceptance of need for psychiatric medications, acceptance of need for psychiatric treatment, acceptance of need for psychiatric follow up after discharge, acceptance of psychiatric diagnosis    Progress Towards Goals: starting psychiatric medications as prescribed    Recommended Treatment: medication management, patient medication education, group therapy, milieu therapy, continued Behavioral Health psychiatric evaluation/assessment process    Treatment Frequency: daily medication monitoring, group and milieu therapy daily, monitoring through interdisciplinary rounds, monitoring through weekly patient care conferences    Expected Discharge Date:  Up to 30 midnight    Discharge Plan: discharge to home, referral for outpatient medication management with a psychiatrist, referral for outpatient psychotherapy, referrals as indicated    Treatment Plan Created/Updated By: Severino Solorzano DO

## 2023-01-15 NOTE — NURSING NOTE
Pt was napping, awake for dinner  Reports some improvement in mood, anxiety is still manageable, but discussed coping skills and available PRN meds if coping skills ineffective, pt verbally acknowledged  Discussed Remeron and answered questions  Pt denies SI, HI, AVH  Encouraged to attend group this evening

## 2023-01-15 NOTE — CONSULTS
[facility] 2005, 21 y o  female MRN: [id number]  Unit/Bed#: U 258-01 Encounter: [id number]  Primary Care Provider: Yovani Rubio DO   Date and time admitted to hospital: 1/14/2023  4:52 PM    Inpatient consult for Medical Clearance for [address] patient  Consult performed by: Raven Lomeli PA-C  Consult ordered by: Matt Cole, DO          Medical clearance for psychiatric admission  Assessment & Plan  • Admission labs reviewed, CBC, CMP, TSH, lipid panel acceptable  o UA suggests UTI, if patient is symptomatic, would give Bactrim x 3days  o Culture pending  • RPR, vitamin D 25-hydroxy labs pending  • Vitals stable   • UDS positive for THC  • COVID-19 negative  • EKG reveals NSR, 53bpm,   • Medically stable for continued inpatient psychiatric treatment based on available results  • Please contact [facility]IM with any questions or concerns      Abnormal urinalysis  Assessment & Plan  · Grossly positive however, patient does not complain of any symptoms  · If patient does develop symptoms, treat with 3 days of Bactrim DS q12  · Cultures ordered today    Anxiety  Assessment & Plan  · Management per psychiatry    * MDD (major depressive disorder), recurrent episode, severe ([facility] [address] )  Assessment & Plan  · Management per psychiatry      VTE Prophylaxis: VTE Score: 0 Low Risk (Score 0-2) - Encourage Ambulation  Recommendations for Discharge:  · Discharge timeline per primary team   Routine follow-up with primary care provider   on cessation from marijuana use  Counseling / Coordination of Care Time: 30 minutes Greater than 50% of total time spent on patient counseling and coordination of care  Collaboration of Care:  Were Recommendations Directly Discussed with Primary Treatment Team? No    History of Present Illness:  Bettina Angelo is a 21 y o  female who is originally admitted to the behavioral health service due to worsening depression, suicidal ideation  We are consulted for management of chronic medical conditions  Patient denies any chronic medical conditions for which she takes daily medications  Patient should continue all prior to admission medications as prescribed by primary care provider/outpatient specialists  Patient denies recent travel, illness or sick contacts  Patient denies smoking, drinking or drug use  Available admission lab work and vitals are acceptable  Patient feels a baseline physical health  Patient appears medically stable for inpatient psychiatric treatment at this time  Please contact SLIM with any medical questions or concerns if issues should arise  Review of Systems:  Review of Systems   Psychiatric/Behavioral: Positive for dysphoric mood and suicidal ideas  All other systems reviewed and are negative  Past Medical and Surgical History:   History reviewed  No pertinent past medical history  No past surgical history on file  Meds/Allergies:  PTA meds:   Prior to Admission Medications   Prescriptions Last Dose Informant Patient Reported? Taking?   ondansetron (ZOFRAN) 4 mg tablet   No No   Sig: Take 1 tablet (4 mg total) by mouth every 8 (eight) hours as needed for nausea or vomiting   sertraline (Zoloft) 25 mg tablet   No No   Sig: Take 1 tablet (25 mg total) by mouth daily   Patient not taking: Reported on 1/14/2023      Facility-Administered Medications: None       Allergies: Allergies   Allergen Reactions   • Azithromycin Hives       Social History:  Marital Status: Single  Substance Use History:   Social History     Substance and Sexual Activity   Alcohol Use Never     Social History     Tobacco Use   Smoking Status Never   Smokeless Tobacco Never     Social History     Substance and Sexual Activity   Drug Use Not Currently   • Types: Marijuana       Family History:  History reviewed  No pertinent family history      Physical Exam:   Vitals:   Blood Pressure: 110/82 (01/15/23 1133)  Pulse: 82 (01/15/23 1133)  Temperature: 97 6 °F (36 4 °C) (01/15/23 0747)  Temp Source: Tympanic (01/15/23 0747)  Respirations: 17 (01/15/23 1133)  Height: 5' 4" (162 6 cm) (01/14/23 1709)  Weight - Scale: 48 1 kg (106 lb) (01/14/23 1709)  SpO2: 98 % (01/14/23 1709)    Physical Exam  Vitals and nursing note reviewed  Constitutional:       General: She is awake  She is not in acute distress  HENT:      Head: Normocephalic and atraumatic  Cardiovascular:      Rate and Rhythm: Normal rate and regular rhythm  Heart sounds: No murmur heard  Pulmonary:      Effort: Pulmonary effort is normal       Breath sounds: Normal breath sounds  Abdominal:      General: There is no distension  Palpations: Abdomen is soft  Musculoskeletal:      Right lower leg: No edema  Left lower leg: No edema  Skin:     General: Skin is warm and dry  Neurological:      Mental Status: She is alert  Comments: CN II-XII grossly intact        Additional Data:   Lab Results:    Results from last 7 days   Lab Units 01/15/23  0547   WBC Thousand/uL 5 31   HEMOGLOBIN g/dL 13 1   HEMATOCRIT % 40 5   PLATELETS Thousands/uL 209   NEUTROS PCT % 35*   LYMPHS PCT % 53*   MONOS PCT % 9   EOS PCT % 2     Results from last 7 days   Lab Units 01/15/23  0547   SODIUM mmol/L 141   POTASSIUM mmol/L 3 7   CHLORIDE mmol/L 108   CO2 mmol/L 27   BUN mg/dL 10   CREATININE mg/dL 0 78   ANION GAP mmol/L 6   CALCIUM mg/dL 8 7   ALBUMIN g/dL 3 9   TOTAL BILIRUBIN mg/dL 0 40   ALK PHOS U/L 76   ALT U/L 16   AST U/L 12   GLUCOSE RANDOM mg/dL 84             No results found for: HGBA1C            Imaging: No pertinent imaging reviewed  No orders to display       EKG, Pathology, and Other Studies Reviewed on Admission:   · EKG: NSR  HR 53bpm,   ** Please Note: This note may have been constructed using a voice recognition system   **

## 2023-01-15 NOTE — PLAN OF CARE
Problem: SELF HARM/SUICIDALITY  Goal: Will have no self-injury during hospital stay  Description: INTERVENTIONS:  - Q 15 MINUTES: Routine safety checks  - Q WAKING SHIFT & PRN: Assess risk to determine if routine checks are adequate to maintain patient safety  - Encourage patient to participate actively in care by formulating a plan to combat response to suicidal ideation, identify supports and resources  Outcome: Progressing     Problem: DEPRESSION  Goal: Will be euthymic at discharge  Description: INTERVENTIONS:  - Administer medication as ordered  - Provide emotional support via 1:1 interaction with staff  - Encourage involvement in milieu/groups/activities  - Monitor for social isolation  Outcome: Progressing     Problem: ANXIETY  Goal: Will report anxiety at manageable levels  Description: INTERVENTIONS:  - Administer medication as ordered  - Teach and encourage coping skills  - Provide emotional support  - Assess patient/family for anxiety and ability to cope  Outcome: Progressing  Goal: By discharge: Patient will verbalize 2 strategies to deal with anxiety  Description: Interventions:  - Identify any obvious source/trigger to anxiety  - Staff will assist patient in applying identified coping technique/skills  - Encourage attendance of scheduled groups and activities  Outcome: Progressing     Problem: DISCHARGE PLANNING  Goal: Discharge to home or other facility with appropriate resources  Description: INTERVENTIONS:  - Identify barriers to discharge w/patient and caregiver  - Arrange for needed discharge resources and transportation as appropriate  - Identify discharge learning needs (meds, wound care, etc )  - Arrange for interpretive services to assist at discharge as needed  - Refer to Case Management Department for coordinating discharge planning if the patient needs post-hospital services based on physician/advanced practitioner order or complex needs related to functional status, cognitive ability, or social support system  Outcome: Progressing     Problem: Ineffective Coping  Goal: Participates in unit activities  Description: Interventions:  - Provide therapeutic environment   - Provide required programming   - Redirect inappropriate behaviors   Outcome: Progressing     Problem: Nutrition/Hydration-ADULT  Goal: Nutrient/Hydration intake appropriate for improving, restoring or maintaining nutritional needs  Description: Monitor and assess patient's nutrition/hydration status for malnutrition  Collaborate with interdisciplinary team and initiate plan and interventions as ordered  Monitor patient's weight and dietary intake as ordered or per policy  Utilize nutrition screening tool and intervene as necessary  Determine patient's food preferences and provide high-protein, high-caloric foods as appropriate       INTERVENTIONS:  - Monitor oral intake, urinary output, labs, and treatment plans  - Assess nutrition and hydration status and recommend course of action  - Evaluate amount of meals eaten  - Recommend/ encourage appropriate diets, oral nutritional supplements, and vitamin/mineral supplements  - Provide specific nutrition/hydration education as appropriate  Outcome: Progressing     Problem: SAFETY ADULT  Goal: Patient will remain free of falls  Description: INTERVENTIONS:  - Educate patient/family on patient safety including physical limitations  - Instruct patient to call for assistance with activity   - Consult OT/PT to assist with strengthening/mobility   - Keep Call bell within reach  - Keep bed low and locked with side rails adjusted as appropriate  - Keep care items and personal belongings within reach  - Initiate and maintain comfort rounds  - Make Fall Risk Sign visible to staff  - Apply yellow socks and bracelet for high fall risk patients  - Consider moving patient to room near nurses station  Outcome: Progressing

## 2023-01-15 NOTE — ED NOTES
Insurance Authorization for admission:   Phone call placed to [facility]  Phone number: [phone]  Spoke to Diana Ruiz [address]   4 days approved    Level of care:201

## 2023-01-15 NOTE — ASSESSMENT & PLAN NOTE
• Admission labs reviewed, CBC, CMP, TSH, lipid panel acceptable  o UA suggests UTI, if patient is symptomatic, would give Bactrim x 3days  o Culture pending  • RPR, vitamin D 25-hydroxy labs pending  • Vitals stable   • UDS positive for THC  • COVID-19 negative  • EKG reveals NSR, 53bpm,   • Medically stable for continued inpatient psychiatric treatment based on available results  • Please contact [facility]IM with any questions or concerns

## 2023-01-15 NOTE — NURSING NOTE
Pt is scant but pleasant, appears anxious during interaction  Reports improved sleep with melatonin  Discussed Remeron and starting this scheduled tonight  Pt denied questions at that time  Pt is currently napping following lunch  Reports trying to eat some at each meal, states stopping when starting to feel nauseous  Ate 75% breakfast and 100% lunch  Denies SI, HI, AVH

## 2023-01-16 ENCOUNTER — TELEPHONE (OUTPATIENT)
Dept: FAMILY MEDICINE CLINIC | Facility: CLINIC | Age: 18
End: 2023-01-16

## 2023-01-16 DIAGNOSIS — F41.9 ANXIETY: ICD-10-CM

## 2023-01-16 DIAGNOSIS — F33.2 SEVERE EPISODE OF RECURRENT MAJOR DEPRESSIVE DISORDER, WITHOUT PSYCHOTIC FEATURES (HCC): Primary | ICD-10-CM

## 2023-01-16 PROBLEM — E43 SEVERE PROTEIN-CALORIE MALNUTRITION (HCC): Status: ACTIVE | Noted: 2023-01-16

## 2023-01-16 LAB — RPR SER QL: NORMAL

## 2023-01-16 RX ORDER — CEPHALEXIN 250 MG/1
500 CAPSULE ORAL EVERY 12 HOURS SCHEDULED
Status: DISCONTINUED | OUTPATIENT
Start: 2023-01-16 | End: 2023-01-18 | Stop reason: HOSPADM

## 2023-01-16 RX ORDER — TRAZODONE HYDROCHLORIDE 50 MG/1
50 TABLET ORAL
Status: DISCONTINUED | OUTPATIENT
Start: 2023-01-16 | End: 2023-01-18 | Stop reason: HOSPADM

## 2023-01-16 RX ADMIN — CEPHALEXIN 500 MG: 250 CAPSULE ORAL at 21:13

## 2023-01-16 RX ADMIN — MIRTAZAPINE 15 MG: 15 TABLET, FILM COATED ORAL at 21:12

## 2023-01-16 RX ADMIN — Medication 3 MG: at 21:13

## 2023-01-16 NOTE — TELEPHONE ENCOUNTER
Patient is currently admitted   [facility] to call back and schedule appt with Dr Nicci Herman

## 2023-01-16 NOTE — MALNUTRITION/BMI
This medical record reflects one or more clinical indicators suggestive of malnutrition and/or morbid obesity  Malnutrition Findings:   Adult Malnutrition type: Chronic illness  Adult Degree of Malnutrition: Other severe protein calorie malnutrition  Malnutrition Characteristics: Inadequate energy, Weight loss                  360 Statement: Pt presents as severely malourished as evidenced by <75% po intake > 1 month, BMI 17 41 and 13% wt loss in 10 weeks; significant (1/16/23 46 kg, 11/4/22 53 kg  Treat with Regular diet ans Ensure High Protein BID  BMI Findings:  Adult BMI Classifications: Underweight < 18 5        Body mass index is 17 41 kg/m²  See Nutrition note dated 1/16/23  for additional details  Completed nutrition assessment is viewable in the nutrition documentation

## 2023-01-16 NOTE — PROGRESS NOTES
Progress Note - Behavioral Health   Bearl Schwab 21 y o  female MRN: [id number]  Unit/Bed#: [facility] 258-01 Encounter: [id number]    Assessment/Plan   Principal Problem:    MDD (major depressive disorder), recurrent episode, severe ([address] )  Active Problems:    Anxiety    Social anxiety disorder    Medical clearance for psychiatric admission    Abnormal urinalysis      Subjective: Patient was seen, chart was reviewed, and case was discussed with the team  This is 22 yo female with hx of depression/anxity admitted to inpatient unit on voluntary status for worsening of mood and suicidal ideations in the context of psychosocial stressors  Patient reports depressed mood, anhedonia, fatigue, tiredness, poor sleep, poor appetite and weight loss  She was started on remeron  Reports drowsiness and tiredness this morning     Behavior over the last 24 hours:  unchanged  Sleep: normal  Appetite: improving  Medication side effects: No    Medical ROS: Pertinent items are noted in HPI all other systems are negative    Current Medications:  Current Facility-Administered Medications   Medication Dose Route Frequency   • acetaminophen (TYLENOL) tablet 650 mg  650 mg Oral Q6H PRN   • acetaminophen (TYLENOL) tablet 650 mg  650 mg Oral Q4H PRN   • acetaminophen (TYLENOL) tablet 975 mg  975 mg Oral Q6H PRN   • aluminum-magnesium hydroxide-simethicone (MYLANTA) oral suspension 30 mL  30 mL Oral Q4H PRN   • benztropine (COGENTIN) injection 1 mg  1 mg Intramuscular Q4H PRN Max 6/day   • benztropine (COGENTIN) tablet 1 mg  1 mg Oral Q4H PRN Max 6/day   • bisacodyl (DULCOLAX) rectal suppository 10 mg  10 mg Rectal Daily PRN   • hydrOXYzine HCL (ATARAX) tablet 50 mg  50 mg Oral Q6H PRN Max 4/day    Or   • diphenhydrAMINE (BENADRYL) injection 50 mg  50 mg Intramuscular Q6H PRN   • glycerin-hypromellose- (ARTIFICIAL TEARS) ophthalmic solution 1 drop  1 drop Both Eyes Q3H PRN   • hydrOXYzine HCL (ATARAX) tablet 100 mg  100 mg Oral Q6H PRN Max 4/day    Or   • LORazepam (ATIVAN) injection 2 mg  2 mg Intramuscular Q6H PRN   • hydrOXYzine HCL (ATARAX) tablet 25 mg  25 mg Oral Q6H PRN Max 4/day   • melatonin tablet 3 mg  3 mg Oral HS   • mirtazapine (REMERON) tablet 15 mg  15 mg Oral HS   • OLANZapine (ZyPREXA) tablet 5 mg  5 mg Oral Q4H PRN Max 3/day    Or   • OLANZapine (ZyPREXA) IM injection 2 5 mg  2 5 mg Intramuscular Q4H PRN Max 3/day   • OLANZapine (ZyPREXA) tablet 5 mg  5 mg Oral Q3H PRN Max 3/day    Or   • OLANZapine (ZyPREXA) IM injection 5 mg  5 mg Intramuscular Q3H PRN Max 3/day   • OLANZapine (ZyPREXA) tablet 2 5 mg  2 5 mg Oral Q4H PRN Max 6/day   • polyethylene glycol (MIRALAX) packet 17 g  17 g Oral Daily PRN   • propranolol (INDERAL) tablet 10 mg  10 mg Oral Q8H PRN   • senna-docusate sodium (SENOKOT S) 8 6-50 mg per tablet 1 tablet  1 tablet Oral Daily PRN       Behavioral Health Medications:   all current active meds have been reviewed  Vitals:  Vitals:    01/16/23 0727   BP: 112/68   Pulse: 86   Resp: 16   Temp: 99 5 °F (37 5 °C)   SpO2: 99%       Laboratory results:    I have personally reviewed all pertinent laboratory/tests results      Mental Status Evaluation:    Appearance:  age appropriate   Behavior:  guarded and withdrawn   Speech:  soft   Mood:  anxious, depressed and dysthymic   Affect:  constricted and mood-congruent   Thought Process:  goal directed and logical   Thought Content:  normal   Perceptual Disturbances: None   Risk Potential: Suicidal Ideations none  Homicidal Ideations none  Potential for Aggression No   Sensorium:  person, place and time/date   Memory:  recent and remote memory grossly intact   Consciousness:  alert and awake    Attention: attention span appeared shorter than expected for age   Insight:  limited   Judgment: limited   Gait/Station: normal gait/station   Motor Activity: no abnormal movements       Progress Toward Goals: Progressing    Recommended Treatment: Continue with pharmacotherapy, group therapy, milieu therapy and occupational therapy  Risks, benefits and possible side effects of Medications:   Risks, benefits, alternatives, and possible side effects of patient's psychiatric medications were discussed with patient

## 2023-01-16 NOTE — TELEPHONE ENCOUNTER
----- Message from Hector Li sent at 1/16/2023  9:11 AM EST -----    ----- Message -----  From: Ishan John, PhD  Sent: 1/6/2023   2:19 PM EST  To: Hector Li    Please schedule her to meet with me on the first available date  Thank you    Dr Mayuri Mcdaniel  ----- Message -----  From: Reyna Guerra DO  Sent: 1/6/2023   1:55 PM EST  To: Ishan John, PhD    Hi Dr Mayuri Mcdaniel,    I'd like to refer this patient for an appointment with you  17yo female who has depression as well as weight loss since January  Of note, pt moved out on her own from [place] at that time and now her parents have followed her to [place]  I'm concerned about this change in living dynamics  Thank you so much in advance!   Lynette Valiente

## 2023-01-16 NOTE — PLAN OF CARE
Problem: SELF HARM/SUICIDALITY  Goal: Will have no self-injury during hospital stay  Description: INTERVENTIONS:  - Q 15 MINUTES: Routine safety checks  - Q WAKING SHIFT & PRN: Assess risk to determine if routine checks are adequate to maintain patient safety  - Encourage patient to participate actively in care by formulating a plan to combat response to suicidal ideation, identify supports and resources  Outcome: Progressing     Problem: DEPRESSION  Goal: Will be euthymic at discharge  Description: INTERVENTIONS:  - Administer medication as ordered  - Provide emotional support via 1:1 interaction with staff  - Encourage involvement in milieu/groups/activities  - Monitor for social isolation  Outcome: Progressing     Problem: ANXIETY  Goal: Will report anxiety at manageable levels  Description: INTERVENTIONS:  - Administer medication as ordered  - Teach and encourage coping skills  - Provide emotional support  - Assess patient/family for anxiety and ability to cope  Outcome: Progressing  Goal: By discharge: Patient will verbalize 2 strategies to deal with anxiety  Description: Interventions:  - Identify any obvious source/trigger to anxiety  - Staff will assist patient in applying identified coping technique/skills  - Encourage attendance of scheduled groups and activities  Outcome: Progressing     Problem: Nutrition/Hydration-ADULT  Goal: Nutrient/Hydration intake appropriate for improving, restoring or maintaining nutritional needs  Description: Monitor and assess patient's nutrition/hydration status for malnutrition  Collaborate with interdisciplinary team and initiate plan and interventions as ordered  Monitor patient's weight and dietary intake as ordered or per policy  Utilize nutrition screening tool and intervene as necessary  Determine patient's food preferences and provide high-protein, high-caloric foods as appropriate       INTERVENTIONS:  - Monitor oral intake, urinary output, labs, and treatment plans  - Assess nutrition and hydration status and recommend course of action  - Evaluate amount of meals eaten   - Allow adequate time for meals  - Recommend/ encourage appropriate diets, oral nutritional supplements, and vitamin/mineral supplements  - Provide specific nutrition/hydration education as appropriate  - Include patient/family/caregiver in decisions related to nutrition  Outcome: Progressing     Problem: SAFETY ADULT  Goal: Patient will remain free of falls  Description: INTERVENTIONS:  - Educate patient/family on patient safety including physical limitations  - Instruct patient to call for assistance with activity   - Consult OT/PT to assist with strengthening/mobility   - Keep Call bell within reach  - Keep bed low and locked with side rails adjusted as appropriate  - Keep care items and personal belongings within reach  - Initiate and maintain comfort rounds  - Make Fall Risk Sign visible to staff  - Apply yellow socks and bracelet for high fall risk patients  - Consider moving patient to room near nurses station  Outcome: Progressing

## 2023-01-16 NOTE — NURSING NOTE
Geovanni Rolle reports minimal change in symptoms from previous assessment, still denying any SI, and reporting that she feels tired  Pt added that she is homesick and misses her family  RN validated and encouraged pt to focus on healing while she is here, so she can get home soon  RN touched base with pt on potential UTI symptoms, based on results of her UA  Pt denied any burning, pain, uncomfortability or urgency/frequency upon urination and discussed medical provider starting her on medicine for the results of her urine culture  Pt understanding, denied questions/concerns

## 2023-01-16 NOTE — NURSING NOTE
Patient resting in bed at time of assessment  She reports no SI, no worsening depression or anxiety symptoms, just feels very tired since starting Remeron last night  Pt feels the side effect of the medicine is lingering into today  RN validated pt and encouraged she keep note of this, as this S/E typically subsides after a few days  Nate Kellogg informed this writer she misses her mom and worries about her, as she was helping her dad care for her mother since she fell down the stairs and suffered 4 fx's to her spine and is presently struggling to care for herself or work  RN empathized with pt, and encouraged that while she is here, she check in with mom and dad through the phone, however, focus on getting herself better so she can leave here feeling better and well enough to help her mother

## 2023-01-17 ENCOUNTER — TELEPHONE (OUTPATIENT)
Dept: PSYCHIATRY | Facility: CLINIC | Age: 18
End: 2023-01-17

## 2023-01-17 PROBLEM — Z00.8 MEDICAL CLEARANCE FOR PSYCHIATRIC ADMISSION: Status: RESOLVED | Noted: 2023-01-15 | Resolved: 2023-01-17

## 2023-01-17 LAB — BACTERIA UR CULT: ABNORMAL

## 2023-01-17 RX ADMIN — CEPHALEXIN 500 MG: 250 CAPSULE ORAL at 21:36

## 2023-01-17 RX ADMIN — MIRTAZAPINE 15 MG: 15 TABLET, FILM COATED ORAL at 21:36

## 2023-01-17 RX ADMIN — Medication 3 MG: at 21:36

## 2023-01-17 RX ADMIN — CEPHALEXIN 500 MG: 250 CAPSULE ORAL at 09:16

## 2023-01-17 NOTE — CASE MANAGEMENT
KELLEE sent in basket message to [address] [address] to see if pt is on their waitlist for Therapy and Medication management as pt reported she thought she was
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
## 2023-01-17 NOTE — DISCHARGE INSTR - OTHER ORDERS
[facility] INFORMATION   The PEER LINE is a toll-free telephone number for people in [facility] who are seeking a listening ear for additional support in their recovery from mental illness  The PEER LINE is peer-run and peer-friendly  You can call the Peer Line 24 hours a day  Phone: 0-798-RF-PEERS /(8-380.795.3484)   Emergency [facility] Emergency Services: [phone]  [address]  [address] [facility] , [place], [address] [place] [address]     Text CONNECT to 365561 from anywhere in the [place], anytime, about any type of crisis  A live, trained Crisis Counselor receives the text and lets you know that they are here to listen  The volunteer Crisis Counselor will help you move from a hot moment to a cool moment  Warm Line: [phone]) [phone], [phone], [phone]  If it is not quite a crisis, but you want to talk to someone, 24 hours/day, 7 days/week:  Someone to listen; someone who cares  The [facility] ([facility]) offers various education & support groups for you & your family  For more information visit their website at   http://www paymio/     Dial 2-1-1 to get connected/get help  Free, confidential information & referral available 24/7: Aging Services, Child & Youth Services, Counseling, Education/Training, Food/Shelter/Clothing, Health Services, Parenting, Substance Abuse, Support Groups, Volunteer Opportunities, & much more    Phone: 2-1-1 or 063-210-3837, Web: MASSIEL SM507TQDG DUY, Email: [email or web address]

## 2023-01-17 NOTE — CASE MANAGEMENT
CM received in basket message back from [facility] and they reported:    "I can schedule patient for med mgmt in late April? but for therapy will have to place patient on wait list if you like?"       CM will contact other providers to determine if pt can be scheduled sooner than April

## 2023-01-17 NOTE — BH TRANSITION RECORD
Contact Information: If you have any questions, concerns, pended studies, tests and/or procedures, or emergencies regarding your inpatient behavioral health visit  Please contact [place] [facility] [phone] and ask to speak to a , nurse or physician  A contact is available 24 hours/ 7 days a week at this number  Summary of Procedures Performed During your Stay:  Below is a list of major procedures performed during your hospital stay and a summary of results:  - Cardiac Procedures/Studies: ekg - sinus bradycardia  Pending Studies (From admission, onward)    None        Please follow up on the above pending studies with your PCP and/or referring provider

## 2023-01-17 NOTE — NURSING NOTE
Polite and cooperative during interaction  Attending group  Improved appetite  Compliant with medications, reports mild fatigue  Slight improvement in depression  Denies pain, burning, frequency or discomfort during urination  Provided large cup of ice water

## 2023-01-17 NOTE — PROGRESS NOTES
Pt denies SI  Pt reported feeling tired and homesick  Compliant with medications  Walking with peers at times  DC: TBD     01/17/23 0808   Team Meeting   Meeting Type Daily Rounds   Team Members Present   Team Members Present Physician;;Nurse; Other (Discipline and Name)   Physician Team Member Dr Stephen Alvarenga / Anna Marie Galaviz / Stefan Anaya Team Member [facility] Management Team Member Aileen Jay / Camella Holstein / Daria Najjar   Other (Discipline and Name) Yaz Wheatley - Art Therapy   Patient/Family Present   Patient Present No   Patient's Family Present No

## 2023-01-17 NOTE — PROGRESS NOTES
Diagnosis of MDD (major depressive disorder), recurrent episode, severe reviewed  Short term goals for decrease in depressive symptoms, decrease in anxiety symptoms, decrease in suicidal thoughts, decrease in self abusive behaviors, improvement in insight, improvement in reality testing, improvement in reasoning ability, improvement in self care discussed  All present parties in agreement and treatment plan signed      01/16/23 0850   Team Meeting   Meeting Type Tx Team Meeting   Team Members Present   Team Members Present Physician;Nurse;   Physician Team Member Dr Sabrina Connor Team Member [facility] Management Team Member Yvonne   Patient/Family Present   Patient Present No  (pt declined to meet with treatment team)   Patient's Family Present No
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
## 2023-01-17 NOTE — CASE MANAGEMENT
CM called [facility] ([phone]) to see if pt can be scheduled for outpatient therapy and medication management after dc tomorrow 1/18/23  Pt was scheduled for IN PERSON Intake on Thursday February 2, 2023 at [address] [facility] with Joanne Perez  Pt will need to bring her ID and insurance card

## 2023-01-17 NOTE — DISCHARGE INSTR - APPOINTMENTS
You will be discharged to your home at [address] [facility], [address] [place] [address]    You confirmed that your cell phone number is: [phone]        Kar Lemon or [place], our [address] [place] [address], will be calling you after your discharge, on the phone number that you provided  They will be available as an additional support, if needed  If you wish to speak with one of them, you may contact Deandra Bingham at [phone] or Maciej Nur at [phone]

## 2023-01-18 VITALS
OXYGEN SATURATION: 98 % | DIASTOLIC BLOOD PRESSURE: 62 MMHG | WEIGHT: 101.41 LBS | HEART RATE: 64 BPM | RESPIRATION RATE: 16 BRPM | BODY MASS INDEX: 17.31 KG/M2 | TEMPERATURE: 98.2 F | SYSTOLIC BLOOD PRESSURE: 109 MMHG | HEIGHT: 64 IN

## 2023-01-18 RX ORDER — CEPHALEXIN 500 MG/1
500 CAPSULE ORAL EVERY 12 HOURS SCHEDULED
Qty: 12 CAPSULE | Refills: 0 | Status: SHIPPED | OUTPATIENT
Start: 2023-01-18 | End: 2023-01-24

## 2023-01-18 RX ORDER — LANOLIN ALCOHOL/MO/W.PET/CERES
3 CREAM (GRAM) TOPICAL
Qty: 30 TABLET | Refills: 1 | Status: SHIPPED | OUTPATIENT
Start: 2023-01-18 | End: 2023-03-19

## 2023-01-18 RX ORDER — MIRTAZAPINE 15 MG/1
15 TABLET, FILM COATED ORAL
Qty: 30 TABLET | Refills: 1 | Status: SHIPPED | OUTPATIENT
Start: 2023-01-18 | End: 2023-03-19

## 2023-01-18 RX ADMIN — CEPHALEXIN 500 MG: 250 CAPSULE ORAL at 08:27

## 2023-01-18 NOTE — TREATMENT TEAM
01/15/23 0700   Team Meeting   Meeting Type Daily Rounds   Team Members Present   Team Members Present Physician;Nurse   Physician Team Member Dr Dhaval Rod Team Member Miguelangel Estrada   Patient/Family Present   Patient Present No   Patient's Family Present No       AM rounds- SI, racing thoughts  Increased anxiety and depression  Poor appetite and weight loss recently  Recently started zoloft and zofran about 4 days ago  Calm and cooperative on unit  Slept well     Readmit score- 16
01/17/23 0930   Activity/Group Checklist   Group Community meeting   Attendance Attended   Attendance Duration (min) 16-30   Interactions Interacted appropriately   Affect/Mood Calm   Goals Achieved Able to listen to others; Able to engage in interactions; Other (Comment)  (identified goals for the day)
01/17/23 1000   Activity/Group Checklist   Group Exercise  (1 mile walk)   Attendance Attended   Attendance Duration (min) 0-15  (left early and laid down in room)   Interactions Interacted appropriately   Affect/Mood Calm   Goals Achieved Able to engage in interactions; Able to listen to others; Other (Comment)  (briefly walked laps on the unit with peers and this therapist)
01/17/23 1330   Activity/Group Checklist   Group Other (Comment)  (art therapy)   Attendance Attended   Attendance Duration (min) 31-45  (came to group late, but immediately engaged in the session)   Interactions Interacted appropriately   Affect/Mood Appropriate   Goals Achieved Able to engage in interactions; Able to listen to others; Other (Comment)  (authentic, spontaneous self expression, connection, and insight)
01/18/23 0810   Activity/Group Checklist   Group Admission/Discharge   Attendance Attended   Attendance Duration (min) 0-15   Interactions Interacted appropriately  (pt independently filled out the relapse prevention plan form, with this therapist available for support  once completed, pt had no questions or concerns for this therapist at this time   [place] crisis # circled for pt, copy placed in DC folder )   Affect/Mood Appropriate   Goals Achieved Able to engage in interactions
01/18/23 0969   Activity/Group Checklist   Group Community meeting   Attendance Attended   Attendance Duration (min) 16-30   Interactions Interacted appropriately   Affect/Mood Appropriate   Goals Achieved Able to listen to others; Able to engage in interactions; Other (Comment)  (identified goals for the day)
01/18/23 1000   Activity/Group Checklist   Group Exercise  (guided meditation)   Attendance Attended   Attendance Duration (min) 0-15  (left session early due to her DC)   Interactions Interacted appropriately   Affect/Mood Calm   Goals Achieved Able to listen to others
with patient

## 2023-01-18 NOTE — NURSING NOTE
Patient visible, social and cooperative within milieu  She reports being hopeful to discharge tomorrow morning and feels less home sick today  Pt observed coloring with her peers before bed    Denies any SI

## 2023-01-18 NOTE — PROGRESS NOTES
Attended group alongside other people on the unit, participated in discussion around recovery-centered topic, and contributed their own lived experience toward connection between group members        01/17/23 1230   Activity/Group Checklist   Group Other (Comment)  (12:30 PM [employer], 3:00 PM Goal Accomplishments, 5:00 PM Reflective Music Groups)   Attendance Attended   Attendance Duration (min) Greater than 60  (> 150 min)   Interactions Interacted appropriately   Affect/Mood Appropriate   Goals Achieved Other (Comment)  (Engaged with CPS and others in conversation around nine types of intelligence, ‘life-hacks’, awareness of anxiety symptoms, self-reflection in completing ‘Music Mad Libs’ worksheets, and individual connection to music and lyrics of multiple languages )

## 2023-01-18 NOTE — PROGRESS NOTES
Attended group alongside other people on the unit, and participated in discussion around recovery-centered topic       01/16/23 1500   Activity/Group Checklist   Group Community meeting  (3:00 PM Goal Accomplishments Group)   Attendance Attended   Attendance Duration (min) 31-45   Interactions Interacted appropriately   Affect/Mood Appropriate   Goals Achieved Other (Comment)  (Engaged with CPS and others around accessible community and online support groups )

## 2023-01-18 NOTE — NURSING NOTE
Pt is pleasant and cooperative during interaction  Denies SI or any thoughts of self harm  Expresses readiness for discharge today  AVS reviewed and prescriptions have been e-prescribed  Pt expresses understanding of all  Denies any questions or concerns  Pt discharged from unit via father

## 2023-01-18 NOTE — PROGRESS NOTES
Pt reported some fatigue, denies SI  Reported feeling ready for dc home today  DC: Today - pt dad will pick her up around 10am    01/18/23 0806   Team Meeting   Meeting Type Daily Rounds   Team Members Present   Team Members Present Physician;Nurse;; Other (Discipline and Name)   Physician Team Member Dr Stephen Alvarenga / Anna Marie Galaviz / Stefan Anaya Team Member [facility] Management Team Member Aileen Jay / Camella Holstein   Other (Discipline and Name) [place] - Art Therapy   Patient/Family Present   Patient Present No   Patient's Family Present No

## 2023-01-18 NOTE — PROGRESS NOTES
New admission - 201 from [facility] ED  First hospitalization  Reported increased anxiety, racing thoughts, SI, poor sleep  Reported recent 20 lb weight loss in last 3 months  Pleasant, cooperative upon admission and slept overnight  DC: TBD    01/16/23 0803   Team Meeting   Meeting Type Daily Rounds   Team Members Present   Team Members Present Physician;Nurse;; Other (Discipline and Name)   Physician Team Member Dr Aliza Mathis / Anahi Matthew / Joyce Vargas Team Member [facility] Management Team Member Eneida Ahn / Melo Mlaoney / Margarita Bazzi   Other (Discipline and Name) Rishabh Speaks - Art Therapy   Patient/Family Present   Patient Present No   Patient's Family Present No

## 2023-01-20 ENCOUNTER — CONSULT (OUTPATIENT)
Dept: ENDOCRINOLOGY | Facility: CLINIC | Age: 18
End: 2023-01-20

## 2023-01-20 VITALS
WEIGHT: 108 LBS | HEIGHT: 64 IN | HEART RATE: 77 BPM | BODY MASS INDEX: 18.44 KG/M2 | DIASTOLIC BLOOD PRESSURE: 60 MMHG | SYSTOLIC BLOOD PRESSURE: 98 MMHG

## 2023-01-20 DIAGNOSIS — R63.4 WEIGHT LOSS: ICD-10-CM

## 2023-01-20 DIAGNOSIS — R42 DIZZINESS: ICD-10-CM

## 2023-01-20 DIAGNOSIS — F33.2 SEVERE EPISODE OF RECURRENT MAJOR DEPRESSIVE DISORDER, WITHOUT PSYCHOTIC FEATURES (HCC): ICD-10-CM

## 2023-01-20 DIAGNOSIS — N92.6 IRREGULAR MENSTRUAL CYCLE: Primary | ICD-10-CM

## 2023-01-20 DIAGNOSIS — E55.9 VITAMIN D DEFICIENCY: ICD-10-CM

## 2023-01-20 NOTE — ASSESSMENT & PLAN NOTE
Nexplanon can cause irregular bleeding patterns and can cause infrequent, absent, prolonged and frequent bleeding  Follow-up with primary care to see if she will benefit from any alternative form of contraception    For now we will check thyroid function tests as well as prolactin

## 2023-01-20 NOTE — ASSESSMENT & PLAN NOTE
Patient report given to TAISHA simmons. Answered all questions and concerns. Will handover care at this time.     Weight loss likely related to mood disorder as she has gained weight recently after hospitalization    However we will check a m  cortisol

## 2023-01-30 ENCOUNTER — APPOINTMENT (OUTPATIENT)
Dept: LAB | Age: 18
End: 2023-01-30

## 2023-01-30 DIAGNOSIS — R63.4 WEIGHT LOSS: ICD-10-CM

## 2023-01-30 DIAGNOSIS — N92.6 IRREGULAR MENSTRUAL CYCLE: ICD-10-CM

## 2023-01-30 LAB
BASOPHILS # BLD AUTO: 0.05 THOUSANDS/ÂΜL (ref 0–0.1)
BASOPHILS NFR BLD AUTO: 1 % (ref 0–1)
CORTIS AM PEAK SERPL-MCNC: 21.8 UG/DL (ref 4.2–22.4)
EOSINOPHIL # BLD AUTO: 0.09 THOUSAND/ÂΜL (ref 0–0.61)
EOSINOPHIL NFR BLD AUTO: 1 % (ref 0–6)
ERYTHROCYTE [DISTWIDTH] IN BLOOD BY AUTOMATED COUNT: 12 % (ref 11.6–15.1)
HCT VFR BLD AUTO: 43.3 % (ref 34.8–46.1)
HGB BLD-MCNC: 14 G/DL (ref 11.5–15.4)
IMM GRANULOCYTES # BLD AUTO: 0.02 THOUSAND/UL (ref 0–0.2)
IMM GRANULOCYTES NFR BLD AUTO: 0 % (ref 0–2)
LYMPHOCYTES # BLD AUTO: 2.4 THOUSANDS/ÂΜL (ref 0.6–4.47)
LYMPHOCYTES NFR BLD AUTO: 36 % (ref 14–44)
MCH RBC QN AUTO: 30.2 PG (ref 26.8–34.3)
MCHC RBC AUTO-ENTMCNC: 32.3 G/DL (ref 31.4–37.4)
MCV RBC AUTO: 93 FL (ref 82–98)
MONOCYTES # BLD AUTO: 0.67 THOUSAND/ÂΜL (ref 0.17–1.22)
MONOCYTES NFR BLD AUTO: 10 % (ref 4–12)
NEUTROPHILS # BLD AUTO: 3.43 THOUSANDS/ÂΜL (ref 1.85–7.62)
NEUTS SEG NFR BLD AUTO: 52 % (ref 43–75)
NRBC BLD AUTO-RTO: 0 /100 WBCS
PLATELET # BLD AUTO: 251 THOUSANDS/UL (ref 149–390)
PMV BLD AUTO: 9.6 FL (ref 8.9–12.7)
PROLACTIN SERPL-MCNC: 19.1 NG/ML
RBC # BLD AUTO: 4.64 MILLION/UL (ref 3.81–5.12)
T4 FREE SERPL-MCNC: 0.97 NG/DL (ref 0.78–1.33)
TSH SERPL DL<=0.05 MIU/L-ACNC: 2.18 UIU/ML (ref 0.45–4.5)
WBC # BLD AUTO: 6.66 THOUSAND/UL (ref 4.31–10.16)

## 2023-01-31 LAB
DHEA-S SERPL-MCNC: 297 UG/DL (ref 110–433.2)
TESTOST FREE SERPL-MCNC: 5.5 PG/ML
TESTOST SERPL-MCNC: 31 NG/DL (ref 13–71)

## 2023-02-02 ENCOUNTER — OFFICE VISIT (OUTPATIENT)
Dept: FAMILY MEDICINE CLINIC | Facility: CLINIC | Age: 18
End: 2023-02-02

## 2023-02-02 VITALS
SYSTOLIC BLOOD PRESSURE: 107 MMHG | RESPIRATION RATE: 14 BRPM | HEIGHT: 64 IN | HEART RATE: 81 BPM | DIASTOLIC BLOOD PRESSURE: 70 MMHG | TEMPERATURE: 98.3 F | BODY MASS INDEX: 17.93 KG/M2 | OXYGEN SATURATION: 99 % | WEIGHT: 105 LBS

## 2023-02-02 DIAGNOSIS — R63.4 WEIGHT LOSS: ICD-10-CM

## 2023-02-02 DIAGNOSIS — N92.6 IRREGULAR MENSTRUAL CYCLE: Primary | ICD-10-CM

## 2023-02-02 DIAGNOSIS — F33.2 SEVERE EPISODE OF RECURRENT MAJOR DEPRESSIVE DISORDER, WITHOUT PSYCHOTIC FEATURES (HCC): ICD-10-CM

## 2023-02-02 NOTE — ASSESSMENT & PLAN NOTE
17yo female PMH depression & anxiety presenting for f/u visit  Pt admitted to inpatient psych 1/14-1/18, discharged with Remeron & melatonin  Pt reports feeling much better and tolerating medication well  On physical exam, pt appears very pleasant and upbeat      Plan:  - Will maintain pt on Remeron & melatonin  - Continue trying to establish with psych  - Dr Shannen Dickens aware of pt and will be contacting her to establish for behavioral health therapy

## 2023-02-02 NOTE — ASSESSMENT & PLAN NOTE
15yo female presenting for f/u irregular menstrual period  Pt seen by endocrinology on 1/20/23  Lab workup ordered, recommended alternative birth control  Pt would like to have Nexplanon removed      Plan:  - Will coordinate with clinical staff to schedule procedure visit

## 2023-02-02 NOTE — ASSESSMENT & PLAN NOTE
Wt Readings from Last 3 Encounters:   02/02/23 47 6 kg (105 lb) (8 %, Z= -1 38)*   01/20/23 49 kg (108 lb) (13 %, Z= -1 14)*   01/16/23 46 kg (101 lb 6 6 oz) (5 %, Z= -1 67)*     * Growth percentiles are based on [facility] (Girls, 2-20 Years) data  15yo female presenting for follow up visit  Pt continues to lose weight, but mood has improved since last visit  Medical workup previously negative  Plan:  - Counseled pt to increase protein & fat intake - protein shakes, full fat options whenever possible    - F/u soon for Nexplanon removal, f/u weight at subsequent visits

## 2023-02-02 NOTE — PROGRESS NOTES
Name: Pastor Fernandes      : 2005      MRN: [id number]  Encounter Provider: Vicki Norris DO  Encounter Date: 2023   Encounter department: [address] [place] [address]  Irregular menstrual cycle  Assessment & Plan:  15yo female presenting for f/u irregular menstrual period  Pt seen by endocrinology on 23  Lab workup ordered, recommended alternative birth control  Pt would like to have Nexplanon removed  Plan:  - Will coordinate with clinical staff to schedule procedure visit      2  Severe episode of recurrent major depressive disorder, without psychotic features ([facility] [address] )  Assessment & Plan:  15yo female PMH depression & anxiety presenting for f/u visit  Pt admitted to inpatient psych -, discharged with Remeron & melatonin  Pt reports feeling much better and tolerating medication well  On physical exam, pt appears very pleasant and upbeat  Plan:  - Will maintain pt on Remeron & melatonin  - Continue trying to establish with psych  - Dr Sruthi Dolan aware of pt and will be contacting her to establish for behavioral health therapy      3  Weight loss  Assessment & Plan:  Wt Readings from Last 3 Encounters:   23 47 6 kg (105 lb) (8 %, Z= -1 38)*   23 49 kg (108 lb) (13 %, Z= -1 14)*   23 46 kg (101 lb 6 6 oz) (5 %, Z= -1 67)*     * Growth percentiles are based on CDC (Girls, 2-20 Years) data  15yo female presenting for follow up visit  Pt continues to lose weight, but mood has improved since last visit  Medical workup previously negative  Plan:  - Counseled pt to increase protein & fat intake - protein shakes, full fat options whenever possible  - F/u soon for Nexplanon removal, f/u weight at subsequent visits           Subjective      15yo female PMH depression & anxiety presenting for follow up visit  Pt last seen by me 2022  Pt admitted for inpatient psych since last visit -22   Pt reports she was "going through a lot and needed some space " Previously living independently with her significant other, now parents have moved in with her  Pt discharged with Remeron and melatonin  Pt reports feeling much better since her discharge  Pt has not established with psych or Dr Raina Davis, but they are both aware of the pt  Pt referred to endocrine for concern of irregular menstrual periods  Transvaginal ultrasound negative  Medical workup is ongoing  Endocrine recommends considering alternative contraception methods  Pt is agreeable to this plan and requests to have Nexplanon removed  Review of Systems   Constitutional: Negative for chills and fever  HENT: Negative for congestion  Eyes: Negative for visual disturbance  Respiratory: Negative for shortness of breath  Cardiovascular: Negative for chest pain and palpitations  Gastrointestinal: Negative for abdominal pain  Allergic/Immunologic: Negative for environmental allergies and food allergies  Neurological: Negative for dizziness, weakness and headaches  Psychiatric/Behavioral: Negative for sleep disturbance  Current Outpatient Medications on File Prior to Visit   Medication Sig   • melatonin 3 mg Take 1 tablet (3 mg total) by mouth daily at bedtime   • mirtazapine (REMERON) 15 mg tablet Take 1 tablet (15 mg total) by mouth daily at bedtime       Objective     /70 (BP Location: Left arm, Patient Position: Sitting, Cuff Size: Standard)   Pulse 81   Temp 98 3 °F (36 8 °C) (Temporal)   Resp 14   Ht 5' 4" (1 626 m)   Wt 47 6 kg (105 lb)   SpO2 99%   BMI 18 02 kg/m²     Physical Exam  Constitutional:       Appearance: Normal appearance  Comments: Pt underweight   HENT:      Head: Normocephalic and atraumatic  Cardiovascular:      Rate and Rhythm: Normal rate and regular rhythm  Heart sounds: Normal heart sounds  Pulmonary:      Effort: Pulmonary effort is normal       Breath sounds: Normal breath sounds  Abdominal:      Tenderness: There is no abdominal tenderness  Musculoskeletal:         General: No swelling or deformity  Skin:     General: Skin is warm and dry  Neurological:      Mental Status: She is alert     Psychiatric:         Mood and Affect: Mood normal          Behavior: Behavior normal       Comments: Patient appears pleasant and upbeat       Bartolo Guerra DO

## 2023-02-03 ENCOUNTER — TELEPHONE (OUTPATIENT)
Dept: PSYCHIATRY | Facility: CLINIC | Age: 18
End: 2023-02-03

## 2023-02-03 NOTE — TELEPHONE ENCOUNTER
Called patient regarding referral to be placed on proper wait list  LVM for patient to call intake dept ([phone]) back

## 2023-02-08 NOTE — TELEPHONE ENCOUNTER
Called patient regarding referral to be placed on proper wait list  LVM for patient to call intake dept ([phone]) back

## 2023-05-11 ENCOUNTER — OFFICE VISIT (OUTPATIENT)
Dept: ENDOCRINOLOGY | Facility: CLINIC | Age: 18
End: 2023-05-11

## 2023-05-11 ENCOUNTER — TELEPHONE (OUTPATIENT)
Dept: FAMILY MEDICINE CLINIC | Facility: CLINIC | Age: 18
End: 2023-05-11

## 2023-05-11 VITALS
HEART RATE: 93 BPM | HEIGHT: 64 IN | BODY MASS INDEX: 18.1 KG/M2 | SYSTOLIC BLOOD PRESSURE: 90 MMHG | DIASTOLIC BLOOD PRESSURE: 62 MMHG | WEIGHT: 106 LBS

## 2023-05-11 DIAGNOSIS — N92.6 IRREGULAR MENSTRUAL CYCLE: Primary | ICD-10-CM

## 2023-05-11 DIAGNOSIS — R63.4 WEIGHT LOSS: ICD-10-CM

## 2023-05-11 DIAGNOSIS — E55.9 VITAMIN D DEFICIENCY: ICD-10-CM

## 2023-05-11 DIAGNOSIS — R63.0 LOSS OF APPETITE: ICD-10-CM

## 2023-05-11 DIAGNOSIS — F32.A DEPRESSION, UNSPECIFIED DEPRESSION TYPE: ICD-10-CM

## 2023-05-11 RX ORDER — ERGOCALCIFEROL 1.25 MG/1
50000 CAPSULE ORAL WEEKLY
Qty: 12 CAPSULE | Refills: 0 | Status: SHIPPED | OUTPATIENT
Start: 2023-05-11

## 2023-05-11 NOTE — PATIENT INSTRUCTIONS
Vitamin-D deficiency-recommend starting vitamin D2 50,000 international units orally once a week for 12 weeks  Then start maintenance with vitamin D3 2000 international units orally daily which the patient can get over-the-counter    Repeat vitamin-D level in 3 months

## 2023-05-11 NOTE — PROGRESS NOTES
Serenity Jiménez 21 y o  female MRN: [id number]    Encounter: [id number]      Assessment/Plan     1  Secondary amenorrhea  2  Contraceptive - progesterone implant (nexplanon)   3  Depression, not well controlled   4  poor appetite, weight loss  Total, free testosterone, thyroid function test, prolactin, DHEA-S within normal limits  Less likely, however will check 17 OHP  Follow-up with PCP/GYN regarding an alternative form of contraception  Nexplanon is known to cause menstrual irregularities as a side effect including amenorrhea    Recommend following up with psychiatry, management of depression    5  vitamin D deficiency  - start vitamin D2 50,000 international units orally once a week for 12 weeks  Then start maintenance with vitamin D3 2000 international units orally daily   Repeat vitamin-D level in 3 months    6  Epigastric pain - follow up with primary care physician         CC: Amenorrhea    History of Present Illness     HPI:  Serenity Jiménez is a 21 y o  female who is here for a follow-up of irregular periods  Last seen in 1/2023    Naxplanon placed July 2021  LMP Nov 2022    lost 20 lbs end 2022, since then initially gained some weight back, now fluctuates a few lbs   Decreased appetite since around Aug-Sept 2022  Depression had worsened around that time   Admitted to inpatient Psych 1/2023 - was advised Remeron however she did not tolerate it well and is currently not on any medication for depression  Has an appointment   C/o fatigue   Occasional acne has ongoing hair loss   No hirsutism   C/o epigastric pain 2/5, constant, laying down helps; worsens with PO intake  no reflux symptoms     No vitamin D3 supplements     All other systems were reviewed and are negative  Review of Systems    Historical Information   Past Medical History:   Diagnosis Date   • Anxiety 2017    Im not sure the exact date  • Depression 2017    im not sure the exact date  History reviewed   No pertinent surgical "history  Social History   Social History     Substance and Sexual Activity   Alcohol Use Never     Social History     Substance and Sexual Activity   Drug Use Not Currently   • Types: Marijuana     Social History     Tobacco Use   Smoking Status Never   Smokeless Tobacco Never     Family History:   Family History   Problem Relation Age of Onset   • No Known Problems Father        Meds/Allergies   Current Outpatient Medications   Medication Sig Dispense Refill   • mirtazapine (REMERON) 15 mg tablet Take 1 tablet (15 mg total) by mouth daily at bedtime (Patient not taking: Reported on 5/11/2023) 30 tablet 1     No current facility-administered medications for this visit  Allergies   Allergen Reactions   • Azithromycin Hives       Objective   Vitals: Blood pressure 90/62, pulse 93, height 5' 4\" (1 626 m), weight 48 1 kg (106 lb)  Physical Exam  Constitutional:       Appearance: She is well-developed  HENT:      Head: Normocephalic and atraumatic  Eyes:      Conjunctiva/sclera: Conjunctivae normal       Pupils: Pupils are equal, round, and reactive to light  Neck:      Thyroid: No thyromegaly  Cardiovascular:      Rate and Rhythm: Normal rate and regular rhythm  Heart sounds: Normal heart sounds  No murmur heard  Pulmonary:      Effort: Pulmonary effort is normal       Breath sounds: Normal breath sounds  No wheezing  Abdominal:      General: There is no distension  Palpations: Abdomen is soft  Comments: Epigastric tenderness +   Musculoskeletal:         General: Normal range of motion  Cervical back: Normal range of motion and neck supple  Skin:     General: Skin is warm and dry  Neurological:      Mental Status: She is alert and oriented to person, place, and time  Psychiatric:         Behavior: Behavior normal          The history was obtained from the review of the chart, patient      Lab Results:   Lab Results   Component Value Date/Time    TSH 3RD [facility] 2 180 " "01/30/2023 08:22 AM    TSH 3RD [facility] 0 860 01/15/2023 05:47 AM    TSH 3RD GENERATON 1 450 11/09/2022 09:21 AM    Free T4 0 97 01/30/2023 08:22 AM     Lab Results   Component Value Date    WBC 6 66 01/30/2023    HGB 14 0 01/30/2023    HCT 43 3 01/30/2023    MCV 93 01/30/2023     01/30/2023     Lab Results   Component Value Date    CREATININE 0 78 01/15/2023    BUN 10 01/15/2023    K 3 7 01/15/2023     01/15/2023    CO2 27 01/15/2023     No results found for: HGBA1C      Imaging Studies:       I have personally reviewed pertinent reports  Portions of the record may have been created with voice recognition software  Occasional wrong word or \"sound a like\" substitutions may have occurred due to the inherent limitations of voice recognition software  Read the chart carefully and recognize, using context, where substitutions have occurred       "

## 2023-05-22 ENCOUNTER — PROCEDURE VISIT (OUTPATIENT)
Dept: FAMILY MEDICINE CLINIC | Facility: CLINIC | Age: 18
End: 2023-05-22

## 2023-05-22 VITALS
HEART RATE: 80 BPM | DIASTOLIC BLOOD PRESSURE: 74 MMHG | BODY MASS INDEX: 18.4 KG/M2 | TEMPERATURE: 97.9 F | RESPIRATION RATE: 16 BRPM | WEIGHT: 107.2 LBS | SYSTOLIC BLOOD PRESSURE: 107 MMHG

## 2023-05-22 DIAGNOSIS — Z30.46 ENCOUNTER FOR NEXPLANON REMOVAL: Primary | ICD-10-CM

## 2023-05-22 DIAGNOSIS — Z30.42 ENCOUNTER FOR DEPO-PROVERA CONTRACEPTION: ICD-10-CM

## 2023-05-22 LAB — SL AMB POCT URINE HCG: NEGATIVE

## 2023-05-22 RX ORDER — MEDROXYPROGESTERONE ACETATE 150 MG/ML
150 INJECTION, SUSPENSION INTRAMUSCULAR ONCE
Status: COMPLETED | OUTPATIENT
Start: 2023-05-22 | End: 2023-05-22

## 2023-05-22 RX ORDER — LIDOCAINE HYDROCHLORIDE AND EPINEPHRINE BITARTRATE 20; .01 MG/ML; MG/ML
3 INJECTION, SOLUTION SUBCUTANEOUS ONCE
Status: COMPLETED | OUTPATIENT
Start: 2023-05-22 | End: 2023-05-22

## 2023-05-22 RX ORDER — LIDOCAINE HYDROCHLORIDE AND EPINEPHRINE BITARTRATE 20; .01 MG/ML; MG/ML
1 INJECTION, SOLUTION SUBCUTANEOUS ONCE
Status: DISCONTINUED | OUTPATIENT
Start: 2023-05-22 | End: 2023-05-22

## 2023-05-22 RX ADMIN — LIDOCAINE HYDROCHLORIDE AND EPINEPHRINE BITARTRATE 2.5 ML: 20; .01 INJECTION, SOLUTION SUBCUTANEOUS at 16:17

## 2023-05-22 RX ADMIN — MEDROXYPROGESTERONE ACETATE 150 MG: 150 INJECTION, SUSPENSION INTRAMUSCULAR at 16:18

## 2023-05-22 NOTE — PROGRESS NOTES
Name: Jory Bernstein      : 2005      MRN: [id number]  Encounter Provider: Elkin Mckeon DO  Encounter Date: 2023   Encounter department: [address] [place] [address]     1  Encounter for Nexplanon removal  Assessment & Plan:  15yo female presents for Nexplanon removal   - Nexplanon removal successful with assistance of Dr Cira Gupta without complications    Plan:  - Pt agreeable to proceed with Depo today for contraception  POCT HCG negative  - Pt educated that she will need backup contraception for the next 7 days  - F/u 3 months for nursing visit    Orders:  -     lidocaine-epinephrine (XYLOCAINE/EPINEPHRINE) 2 %-1:100,000 injection 3 mL  -     Remove and insert drug implant    2  Encounter for Depo-Provera contraception  -     medroxyPROGESTERone (DEPO-PROVERA) IM injection 150 mg  -     POCT urine HCG         Subjective      15yo female presenting for Nexplanon removal  Pt reports it was placed 2 years ago in [place]  Pt requests removal due to it causing discomfort and arm numbness in certain positions  Review of Systems   Constitutional: Negative for chills and fever  HENT: Negative for congestion  Respiratory: Negative for shortness of breath  Cardiovascular: Negative for chest pain and palpitations  Gastrointestinal: Negative for abdominal pain  Allergic/Immunologic: Negative for environmental allergies and food allergies  Neurological: Negative for dizziness, weakness and headaches         Current Outpatient Medications on File Prior to Visit   Medication Sig   • ergocalciferol (ERGOCALCIFEROL) 1 25 MG (17064 UT) capsule Take 1 capsule (50,000 Units total) by mouth once a week   • mirtazapine (REMERON) 15 mg tablet Take 1 tablet (15 mg total) by mouth daily at bedtime (Patient not taking: Reported on 2023)       Objective     /74   Pulse 80   Temp 97 9 °F (36 6 °C)   Resp 16   Wt 48 6 kg (107 lb 3 2 oz)   PF 98 L/min   BMI 18 40 kg/m²     Physical Exam  Constitutional:       Appearance: Normal appearance  HENT:      Head: Normocephalic and atraumatic  Mouth/Throat:      Mouth: Mucous membranes are moist    Eyes:      Extraocular Movements: Extraocular movements intact  Cardiovascular:      Rate and Rhythm: Normal rate and regular rhythm  Pulmonary:      Effort: Pulmonary effort is normal    Skin:     General: Skin is warm and dry  Neurological:      Mental Status: She is alert  Psychiatric:         Mood and Affect: Mood normal          Behavior: Behavior normal         Universal Protocol:  Procedure performed by: Sonya Parmar MD)  Consent: Verbal consent obtained  Written consent obtained    Risks and benefits: risks, benefits and alternatives were discussed  Consent given by: patient  Timeout called at: 5/22/2023 3:00 PM   Patient consent: the patient's understanding of the procedure matches consent given  Procedure consent: procedure consent matches procedure scheduled  Site marked: the operative site was marked  Remove and insert drug implant    Date/Time: 5/22/2023 4:19 PM  Performed by: Chris Guerra DO  Authorized by: Chris Guerra DO     Indication:     Indication: Presence of non-biodegradable drug delivery implant    Pre-procedure:     Prepped with: alcohol 70% and povidone-iodine      Local anesthetic:  Xylocaine with epinephrine    The site was cleaned and prepped in a sterile fashion: yes    Procedure:     Procedure:  Removal    Small stab incision was made in arm: yes      Left/right:  Left    Palpation confirms placement by provider and patient: yes      Site was closed with steri-strips and pressure bandage applied: yes          Farzaneh Guerra DO

## 2023-05-23 NOTE — ASSESSMENT & PLAN NOTE
17yo female presents for Nexplanon removal   - Nexplanon removal successful with assistance of Dr Reta Wasserman without complications    Plan:  - Pt agreeable to proceed with Depo today for contraception   POCT HCG negative  - Pt educated that she will need backup contraception for the next 7 days  - F/u 3 months for nursing visit

## 2023-06-02 ENCOUNTER — TELEPHONE (OUTPATIENT)
Dept: PSYCHIATRY | Facility: CLINIC | Age: 18
End: 2023-06-02

## 2023-08-08 DIAGNOSIS — E55.9 VITAMIN D DEFICIENCY: ICD-10-CM

## 2023-08-08 DIAGNOSIS — N92.6 IRREGULAR MENSTRUAL CYCLE: ICD-10-CM

## 2023-08-08 RX ORDER — ERGOCALCIFEROL 1.25 MG/1
50000 CAPSULE ORAL WEEKLY
Qty: 12 CAPSULE | Refills: 0 | Status: SHIPPED | OUTPATIENT
Start: 2023-08-08

## 2023-08-15 ENCOUNTER — CLINICAL SUPPORT (OUTPATIENT)
Dept: FAMILY MEDICINE CLINIC | Facility: CLINIC | Age: 18
End: 2023-08-15

## 2023-08-15 DIAGNOSIS — Z30.42 DEPO-PROVERA CONTRACEPTIVE STATUS: Primary | ICD-10-CM

## 2023-08-15 PROCEDURE — 96372 THER/PROPH/DIAG INJ SC/IM: CPT

## 2023-08-15 RX ORDER — MEDROXYPROGESTERONE ACETATE 150 MG/ML
150 INJECTION, SUSPENSION INTRAMUSCULAR ONCE
Status: COMPLETED | OUTPATIENT
Start: 2023-08-15 | End: 2023-08-15

## 2023-08-15 RX ADMIN — MEDROXYPROGESTERONE ACETATE 150 MG: 150 INJECTION, SUSPENSION INTRAMUSCULAR at 16:24

## 2023-08-15 NOTE — PROGRESS NOTES
Patient here today for a nurse visit for Depo injection. Medroxy Progesterone Acetate 150 mg/l injectable suspension administered on (left dorsogluteal site) by patient preference and patient tolerate well. Patient has been made aware she would need to come back for her next injection on (10/3/-11/14/23) and patient verbalized understanding. Reminder note handed to patient with next injection dates so patient can scheduled at check out area.

## 2023-10-10 ENCOUNTER — CLINICAL SUPPORT (OUTPATIENT)
Dept: FAMILY MEDICINE CLINIC | Facility: CLINIC | Age: 18
End: 2023-10-10

## 2023-10-10 DIAGNOSIS — Z30.42 ENCOUNTER FOR DEPO-PROVERA CONTRACEPTION: Primary | ICD-10-CM

## 2023-10-10 LAB — SL AMB POCT URINE HCG: NEGATIVE

## 2023-10-10 PROCEDURE — 96372 THER/PROPH/DIAG INJ SC/IM: CPT

## 2023-10-10 PROCEDURE — 81025 URINE PREGNANCY TEST: CPT

## 2023-10-10 RX ORDER — MEDROXYPROGESTERONE ACETATE 150 MG/ML
150 INJECTION, SUSPENSION INTRAMUSCULAR ONCE
Status: COMPLETED | OUTPATIENT
Start: 2023-10-10 | End: 2023-10-10

## 2023-10-10 RX ADMIN — MEDROXYPROGESTERONE ACETATE 150 MG: 150 INJECTION, SUSPENSION INTRAMUSCULAR at 13:42

## 2023-10-10 NOTE — PROGRESS NOTES
Patient presented to office today for a nurse visit for Depo-Provera injection. Medroxy-Progesterone Acetate 150 mg/l injectable suspension administered in (right upper outer quadrant ) by patient preference and patient tolerated well. Patient has been made aware she would need to come back for her next injection between (Dec. 26, 2023- Jan 9, 2024) and patient verbalized understanding. Reminder notice handed to patient with next injection dates so patient can scheduled at checkout area.

## 2023-12-26 ENCOUNTER — CLINICAL SUPPORT (OUTPATIENT)
Dept: FAMILY MEDICINE CLINIC | Facility: CLINIC | Age: 18
End: 2023-12-26

## 2023-12-26 DIAGNOSIS — Z30.42 DEPO-PROVERA CONTRACEPTIVE STATUS: Primary | ICD-10-CM

## 2023-12-26 PROCEDURE — 96372 THER/PROPH/DIAG INJ SC/IM: CPT

## 2023-12-26 RX ORDER — MEDROXYPROGESTERONE ACETATE 150 MG/ML
150 INJECTION, SUSPENSION INTRAMUSCULAR ONCE
Status: COMPLETED | OUTPATIENT
Start: 2023-12-26 | End: 2023-12-26

## 2023-12-26 RX ADMIN — MEDROXYPROGESTERONE ACETATE 150 MG: 150 INJECTION, SUSPENSION INTRAMUSCULAR at 14:01

## 2024-01-23 ENCOUNTER — HOSPITAL ENCOUNTER (EMERGENCY)
Facility: HOSPITAL | Age: 19
Discharge: HOME | End: 2024-01-23
Attending: EMERGENCY MEDICINE
Payer: MEDICARE

## 2024-01-23 VITALS
OXYGEN SATURATION: 100 % | RESPIRATION RATE: 16 BRPM | DIASTOLIC BLOOD PRESSURE: 77 MMHG | HEART RATE: 76 BPM | SYSTOLIC BLOOD PRESSURE: 123 MMHG | TEMPERATURE: 98 F

## 2024-01-23 DIAGNOSIS — M43.6 TORTICOLLIS, ACUTE: Primary | ICD-10-CM

## 2024-01-23 LAB
ATRIAL RATE: 57 BPM
P AXIS: 56 DEGREES
PR INTERVAL: 130 MS
QRS AXIS: 100 DEGREES
QRSD INTERVAL: 94 MS
QT INTERVAL: 410 MS
QTC INTERVAL: 399 MS
T WAVE AXIS: 65 DEGREES
VENTRICULAR RATE: 57 BPM

## 2024-01-23 PROCEDURE — 99283 EMERGENCY DEPT VISIT LOW MDM: CPT

## 2024-01-23 PROCEDURE — 99284 EMERGENCY DEPT VISIT MOD MDM: CPT | Performed by: EMERGENCY MEDICINE

## 2024-01-23 PROCEDURE — 93005 ELECTROCARDIOGRAM TRACING: CPT

## 2024-01-23 RX ORDER — NAPROXEN 500 MG/1
500 TABLET ORAL 2 TIMES DAILY WITH MEALS
Qty: 30 TABLET | Refills: 0 | Status: SHIPPED | OUTPATIENT
Start: 2024-01-23

## 2024-01-23 RX ORDER — NAPROXEN 500 MG/1
500 TABLET ORAL ONCE
Status: COMPLETED | OUTPATIENT
Start: 2024-01-23 | End: 2024-01-23

## 2024-01-23 RX ADMIN — NAPROXEN 500 MG: 500 TABLET ORAL at 19:53

## 2024-01-24 NOTE — ED ATTENDING ATTESTATION
1/23/2024  I, Odilia Hoffman MD, saw and evaluated the patient. I have discussed the patient with the resident/non-physician practitioner and agree with the resident's/non-physician practitioner's findings, Plan of Care, and MDM as documented in the resident's/non-physician practitioner's note, except where noted. All available labs and Radiology studies were reviewed.  I was present for key portions of any procedure(s) performed by the resident/non-physician practitioner and I was immediately available to provide assistance.       At this point I agree with the current assessment done in the Emergency Department.  I have conducted an independent evaluation of this patient a history and physical is as follows:  Patient with several complaints that she believes are unrelated.  At 2:00, the patient woke up with right-sided neck pain that radiates up into her head and down into her arm.  Patient states that it was sharp and made much worse with rotating her head to the right.  Patient has not had trauma, new activities, and has never had pain like this before.  She has not had numbness, tingling, or weakness.  The patient's secondary complaint is that she has bilateral rib pain.  The patient states that several hours after her neck pain started, she developed squeezing chest pain in her bilateral ribs.  She states that at first it was constant, but now it is starting to let go of her.  It is not accompanied by cough, true pleurisy, abdominal pain, nausea, vomiting, or diaphoresis.  The patient's third complaint is a headache.  The patient states that she has developed an occipital headache that feels like a regular headache later on this evening, and thinks maybe it is because she has been so uncomfortable with her neck pain.  The patient has taken Tylenol for her symptoms.  She additionally used IcyHot and a lidocaine patch, which caused her to develop a little bit of a rash.  The patient does not have any PE  risk factors.  She does not have any cardiac risk factors.  The patient smokes marijuana, but does not smoke cigarettes.  Her review of systems otherwise negative in 12 systems reviewed.  On exam vital signs were reviewed.  Patient is awake, alert, interactive.  The patient's pupils are equally round reactive to light.  Oropharynx is clear with moist mucous membranes.  Neck is supple with right trapezius tenderness and spasm with no adenopathy or JVD.  Heart is regular with no murmurs, rubs, or gallops.  Lungs are clear and equal with no wheezes, rales, or rhonchi.  Abdomen is soft and nontender with no masses, rebound, or guarding. There is no CVA tenderness.  The patient was completely exposed.  There is no skin breakdown.  There are no rashes or skin changes.  Extremities are warm and well perfused with good pulses. The patient has normal strength, sensation, and cranial nerves.MEDICAL DECISION MAKING    Number and Complexity of Problems  Differential diagnosis: Torticollis, doubt dissection, primary headache disorder doubt subarachnoid, musculoskeletal chest pain which is resolving, doubt MI, doubt pulmonary embolus    Medical Decision Making Data  External documents reviewed:   My EKG interpretation: Normal sinus rhythm, no ischemia or ectopy  My CT interpretation:   My X-ray interpretation:   My ultrasound interpretation:     No orders to display       Labs Reviewed - No data to display    Labs reviewed by me are significant for:     Clinical decision rules/scores are significant for: Patient is low risk Wells, she is PERC negative, will plan to do EKG, treat neck pain with NSAIDs    Discussed case with:   Considered admission for:     Treatment and Disposition  ED course:   Shared decision making:   Code status:     ED Course       1 atypical chest pain, 2 primary headache disorder, 3 torticollis  Critical Care Time  Procedures

## 2024-01-24 NOTE — DISCHARGE INSTRUCTIONS
Please take the prescribed medication for pain as needed up to two times a day. The muscles should relax over the next few days improving the pain.

## 2024-01-25 ENCOUNTER — TELEPHONE (OUTPATIENT)
Dept: OBGYN CLINIC | Facility: CLINIC | Age: 19
End: 2024-01-25

## 2024-01-27 NOTE — ED PROVIDER NOTES
History  Chief Complaint   Patient presents with    Shoulder Pain     C/o of R shoulder pain that started at 0200 that shot up her neck and now radiates to her ribs     HPI  18-year-old patient states that she woke up this morning with right-sided neck pain that radiates down from her cyst sternocleidomastoid to her right ribs.  Patient states that the feeling is sharp and is made worse with rotation of her head to the right.  The patient does not have any cough pleurisy nausea vomiting or diaphoresis.  The patient put IcyHot over her area of pain and developed a urticarial rash  Prior to Admission Medications   Prescriptions Last Dose Informant Patient Reported? Taking?   ergocalciferol (VITAMIN D2) 50,000 units   No No   Sig: TAKE 1 CAPSULE BY MOUTH 1 TIME A WEEK   mirtazapine (REMERON) 15 mg tablet  Self No No   Sig: Take 1 tablet (15 mg total) by mouth daily at bedtime   Patient not taking: Reported on 5/11/2023      Facility-Administered Medications: None       Past Medical History:   Diagnosis Date    Anxiety 2017    Im not sure the exact date.    Depression 2017    im not sure the exact date.       History reviewed. No pertinent surgical history.    Family History   Problem Relation Age of Onset    No Known Problems Father      I have reviewed and agree with the history as documented.    E-Cigarette/Vaping    E-Cigarette Use Never User      E-Cigarette/Vaping Substances    Nicotine No     THC Yes last use 2-3 weeks ago (1/14/23)    CBD No     Flavoring No     Other No     Unknown No      Social History     Tobacco Use    Smoking status: Never    Smokeless tobacco: Never   Vaping Use    Vaping status: Never Used   Substance Use Topics    Alcohol use: Never    Drug use: Not Currently     Types: Marijuana        Review of Systems   Constitutional:  Negative for chills and fever.   HENT:  Negative for ear pain and sore throat.    Eyes:  Negative for pain and visual disturbance.   Respiratory:  Negative for cough  and shortness of breath.    Cardiovascular:  Negative for chest pain and palpitations.   Gastrointestinal:  Negative for abdominal pain and vomiting.   Genitourinary:  Negative for dysuria and hematuria.   Musculoskeletal:  Positive for arthralgias, joint swelling, myalgias and neck pain. Negative for back pain.   Skin:  Negative for color change and rash.   Neurological:  Negative for seizures and syncope.   All other systems reviewed and are negative.      Physical Exam  ED Triage Vitals [01/23/24 1842]   Temperature Pulse Respirations Blood Pressure SpO2   98 °F (36.7 °C) 76 16 123/77 100 %      Temp src Heart Rate Source Patient Position - Orthostatic VS BP Location FiO2 (%)   -- Monitor -- -- --      Pain Score       8             Orthostatic Vital Signs  Vitals:    01/23/24 1842   BP: 123/77   Pulse: 76       Physical Exam  Vitals and nursing note reviewed.   Constitutional:       General: She is not in acute distress.     Appearance: She is well-developed.   HENT:      Head: Normocephalic and atraumatic.      Right Ear: Tympanic membrane normal.      Left Ear: Tympanic membrane normal.   Eyes:      Conjunctiva/sclera: Conjunctivae normal.   Neck:      Vascular: No carotid bruit.   Cardiovascular:      Rate and Rhythm: Normal rate and regular rhythm.      Heart sounds: No murmur heard.  Pulmonary:      Effort: Pulmonary effort is normal. No respiratory distress.      Breath sounds: Normal breath sounds.   Abdominal:      Palpations: Abdomen is soft.      Tenderness: There is no abdominal tenderness. There is no right CVA tenderness or left CVA tenderness.   Musculoskeletal:         General: Tenderness present. No swelling.      Cervical back: Neck supple. Tenderness present.      Comments: \Tenderness over the right shoulder from her sternocleidomastoid down to her right rib   Lymphadenopathy:      Cervical: Cervical adenopathy present.   Skin:     General: Skin is warm and dry.      Capillary Refill:  Capillary refill takes less than 2 seconds.   Neurological:      Mental Status: She is alert and oriented to person, place, and time. Mental status is at baseline.      Cranial Nerves: No cranial nerve deficit.      Sensory: No sensory deficit.      Motor: No weakness.   Psychiatric:         Mood and Affect: Mood normal.         ED Medications  Medications   naproxen (NAPROSYN) tablet 500 mg (500 mg Oral Given 1/23/24 1953)       Diagnostic Studies  Results Reviewed       None                   No orders to display         Procedures  Procedures      ED Course                                       Medical Decision Making  Risk  Prescription drug management.    18-year-old female with new onset right sided neck pain that radiates down her head to her right ribs.  Patient has been taking Tylenol for pain and has placed IcyHot over the area which developed into a rash.  Patient does not have any PE risk.  On examination, the patient is tender over her right sternocleidomastoid and trapezius.  She does not have any murmurs gallops or thrills.  The patient's lungs are clear and equal without any wheezing.  Patient's abdomen is soft and nontender.  She does not have any CVA tenderness.  Patient rash has gone away with the visit.  Patient is likely suffering from torticollis.  She will be given a shot of Toradol here and discharged with a prescription for naproxen.  An EKG will be done to rule out any cardiac abnormalities.  This came back as a normal EKG.  Patient is given return precautions for any new signs or symptoms of chest pain or shortness of breath.      Disposition  Final diagnoses:   Torticollis, acute     Time reflects when diagnosis was documented in both MDM as applicable and the Disposition within this note       Time User Action Codes Description Comment    1/23/2024  8:24 PM Fortunato Haynes Add [M43.6] Torticollis, acute           ED Disposition       ED Disposition   Discharge    Condition   Stable     Date/Time   Tue Jan 23, 2024  8:24 PM    Comment   Magda Lindsay discharge to home/self care.                   Follow-up Information       Follow up With Specialties Details Why Contact Info    [facility]    [phone]              Discharge Medication List as of 1/23/2024  8:27 PM        START taking these medications    Details   naproxen (Naprosyn) 500 mg tablet Take 1 tablet (500 mg total) by mouth 2 (two) times a day with meals, Starting Tue 1/23/2024, Normal           CONTINUE these medications which have NOT CHANGED    Details   ergocalciferol (VITAMIN D2) 50,000 units TAKE 1 CAPSULE BY MOUTH 1 TIME A WEEK, Starting Tue 8/8/2023, Normal      mirtazapine (REMERON) 15 mg tablet Take 1 tablet (15 mg total) by mouth daily at bedtime, Starting Wed 1/18/2023, Until Sun 3/19/2023, Normal           No discharge procedures on file.    PDMP Review       None             ED Provider  Attending physically available and evaluated Magda Lindsay. I managed the patient along with the ED Attending.    Electronically Signed by           Fortunato Haynes MD  01/27/24 4084

## 2024-01-30 ENCOUNTER — TELEPHONE (OUTPATIENT)
Dept: PSYCHIATRY | Facility: CLINIC | Age: 19
End: 2024-01-30

## 2024-01-30 NOTE — TELEPHONE ENCOUNTER
Contacted PT. In regards to Talk Therapy Wait List Status, LVM to contact [phone] to schedule.

## 2024-02-20 ENCOUNTER — TELEPHONE (OUTPATIENT)
Dept: PSYCHIATRY | Facility: CLINIC | Age: 19
End: 2024-02-20

## 2024-02-20 NOTE — TELEPHONE ENCOUNTER
Contacted Pt. In regards to MM & TT Wait List, Pt. Stated she is no longer interested in services. Removed from Wait Lists.

## 2024-02-26 NOTE — PROGRESS NOTES
Assessment/Plan:     Problem List Items Addressed This Visit       Irregular menstrual cycle - Primary    Relevant Orders    US pelvis complete w transvaginal    CBC and differential    Iron Panel (Includes Ferritin, Iron Sat%, Iron, and TIBC)    TSH, 3rd generation with Free T4 reflex    Testosterone, free, total       CBC and iron panel ordered for review of anemia due to prolonged bleeding.  Thyroid studies, testosterone, and TVUS ordered for evaluation of irregular bleeding.   Previous TVUS report is normal but evaluation of images, shows possible polycystic ovaries. Reviewed finding with patient but reiterated to repeat TVUS.   Reviewed that Nexplanon and DMPA are progestin-only BC options. Counseled patient that estrogen may needed to stabilize uterine lining. Patient is not actively bleeding at this time; will wait for results and then reviewed started estrogen-based BC; such as LUCERO, patch, ring. She is most interested in patch due to forgetting pills.   Plan to return to office to discuss management based on test results or sooner if bleeding reoccurs and heavy.     Subjective:      Patient ID: Magda Lindsay is a 18 y.o. female.    New patient presents to office with concerns of irregular bleeding.   Menarche: 12-13 years old. She reports she has always had irregular periods.  Periods are irregular with prolonged menstruation. She is unsure of her last menstrual period because she has continuous spotting and flow. She has had bleeding every single day for the last two years. On her heaviest day, she needs to change a regular tampon approximately 5 times/day.  She reports occasional dizziness. Denies pelvic pain or dyspareunia. BMI 20; denies hirtuisum, excessive acne, weight fluctuations.    She was previously LUCERO, but transtioned to Nexplanon due to forgetting pill. on the Nexplanon (spotting was prior to Nexplanon) but removed the implant and started DMPA, which has not changed her course of bleeding. Last  DMPA injection was December 2023.      Denies history of GYN disorders. Last pelvic US was 11/2022 normal.  Recent CBC/TSH/T4/prolactin/DHEA/testosterone were WNL in 01/2023.     Denies migraines with auras, personal/family history of bleeding disorders, liver dx, diabetes, and blood clots.  She is sexually active; no new partners in the last year, occasional condom use.         The following portions of the patient's history were reviewed and updated as appropriate: She  has a past medical history of Anxiety (2017) and Depression (2017).  She   Patient Active Problem List    Diagnosis Date Noted    Encounter for Nexplanon removal 05/22/2023    Depression 05/11/2023    Loss of appetite 05/11/2023    Dizziness 01/20/2023    Vitamin D deficiency 01/20/2023    Severe protein-calorie malnutrition (HCC) 01/16/2023    Anxiety 01/15/2023    Social anxiety disorder 01/15/2023    Abnormal urinalysis 01/15/2023    MDD (major depressive disorder), recurrent episode, severe (HCC) 11/04/2022    Irregular menstrual cycle 11/04/2022    Weight loss 11/04/2022     She  has no past surgical history on file.  Her family history includes No Known Problems in her father.  She  reports that she has never smoked. She has never used smokeless tobacco. She reports current drug use. Drug: Marijuana. She reports that she does not drink alcohol.  Current Outpatient Medications   Medication Sig Dispense Refill    ergocalciferol (VITAMIN D2) 50,000 units TAKE 1 CAPSULE BY MOUTH 1 TIME A WEEK 12 capsule 0    medroxyPROGESTERone (Depo-Provera) 150 mg/mL injection Inject 150 mg into a muscle every 3 (three) months      mirtazapine (REMERON) 15 mg tablet Take 1 tablet (15 mg total) by mouth daily at bedtime (Patient not taking: Reported on 5/11/2023) 30 tablet 1    naproxen (Naprosyn) 500 mg tablet Take 1 tablet (500 mg total) by mouth 2 (two) times a day with meals (Patient not taking: Reported on 2/29/2024) 30 tablet 0     No current  "facility-administered medications for this visit.     Current Outpatient Medications on File Prior to Visit   Medication Sig    ergocalciferol (VITAMIN D2) 50,000 units TAKE 1 CAPSULE BY MOUTH 1 TIME A WEEK    medroxyPROGESTERone (Depo-Provera) 150 mg/mL injection Inject 150 mg into a muscle every 3 (three) months    mirtazapine (REMERON) 15 mg tablet Take 1 tablet (15 mg total) by mouth daily at bedtime (Patient not taking: Reported on 5/11/2023)    naproxen (Naprosyn) 500 mg tablet Take 1 tablet (500 mg total) by mouth 2 (two) times a day with meals (Patient not taking: Reported on 2/29/2024)     No current facility-administered medications on file prior to visit.     She is allergic to azithromycin..    Review of Systems      Objective:      /60 (BP Location: Left arm, Patient Position: Sitting, Cuff Size: Standard)   Ht 5' 4\" (1.626 m)   Wt 54.4 kg (120 lb)   LMP  (LMP Unknown) Comment: depo  BMI 20.60 kg/m²          Physical Exam  Vitals and nursing note reviewed.   Constitutional:       General: She is not in acute distress.     Appearance: Normal appearance.   HENT:      Head: Normocephalic.   Eyes:      Conjunctiva/sclera: Conjunctivae normal.   Cardiovascular:      Rate and Rhythm: Normal rate and regular rhythm.      Heart sounds: Normal heart sounds.   Pulmonary:      Effort: Pulmonary effort is normal. No respiratory distress.      Breath sounds: Normal breath sounds.   Abdominal:      General: Abdomen is flat.      Palpations: Abdomen is soft.      Tenderness: There is no right CVA tenderness or left CVA tenderness.   Genitourinary:     General: Normal vulva.      Exam position: Lithotomy position.      Pubic Area: No rash or pubic lice.       Labia:         Right: No rash or tenderness.         Left: No rash or tenderness.       Urethra: No urethral pain.      Vagina: Normal.      Cervix: Normal.      Uterus: Normal.       Comments: Minimal spotting.  Musculoskeletal:         General: Normal " range of motion.      Cervical back: Normal range of motion.      Right lower leg: No edema.      Left lower leg: No edema.   Lymphadenopathy:      Cervical: No cervical adenopathy.      Lower Body: No right inguinal adenopathy. No left inguinal adenopathy.   Skin:     General: Skin is warm and dry.   Neurological:      Mental Status: She is alert and oriented to person, place, and time.   Psychiatric:         Mood and Affect: Mood normal.         Behavior: Behavior normal.         Thought Content: Thought content normal.         Judgment: Judgment normal.

## 2024-02-29 ENCOUNTER — OFFICE VISIT (OUTPATIENT)
Dept: OBGYN CLINIC | Facility: CLINIC | Age: 19
End: 2024-02-29
Payer: MEDICARE

## 2024-02-29 VITALS
WEIGHT: 120 LBS | DIASTOLIC BLOOD PRESSURE: 60 MMHG | HEIGHT: 64 IN | SYSTOLIC BLOOD PRESSURE: 122 MMHG | BODY MASS INDEX: 20.49 KG/M2

## 2024-02-29 DIAGNOSIS — N92.6 IRREGULAR MENSTRUAL CYCLE: Primary | ICD-10-CM

## 2024-02-29 PROCEDURE — 99204 OFFICE O/P NEW MOD 45 MIN: CPT

## 2024-02-29 RX ORDER — MEDROXYPROGESTERONE ACETATE 150 MG/ML
150 INJECTION, SUSPENSION INTRAMUSCULAR
COMMUNITY

## 2024-03-19 ENCOUNTER — TELEPHONE (OUTPATIENT)
Dept: FAMILY MEDICINE CLINIC | Facility: CLINIC | Age: 19
End: 2024-03-19

## 2024-03-26 ENCOUNTER — HOSPITAL ENCOUNTER (OUTPATIENT)
Dept: ULTRASOUND IMAGING | Facility: HOSPITAL | Age: 19
Discharge: HOME | End: 2024-03-26
Payer: MEDICARE

## 2024-03-26 DIAGNOSIS — N92.6 IRREGULAR MENSTRUAL CYCLE: ICD-10-CM

## 2024-03-26 PROCEDURE — 76830 TRANSVAGINAL US NON-OB: CPT

## 2024-03-26 PROCEDURE — 76856 US EXAM PELVIC COMPLETE: CPT

## 2024-05-23 ENCOUNTER — TELEPHONE (OUTPATIENT)
Dept: OBGYN CLINIC | Facility: CLINIC | Age: 19
End: 2024-05-23

## 2024-05-23 NOTE — PROGRESS NOTES
"Subjective      Magda Lindsay is a 18 y.o. female who presents for contraception counseling.      Menarche: 10[birth year]11  LMP: she is currently bleeding, unsure of time due to continued spotting from DMPA  She was seen in the office on 02/29 for irregular, prolonged menses with DMPA. Blood work ordered but not completed. TVUS completed and WNL.    Denies migraines with auras, personal/family history of bleeding disorders, liver dx, diabetes, and blood clots.  She is sexually active; no new partners in the last year, occasional condom use.       Review of Systems  Pertinent items are noted in HPI.     Objective      /70 (BP Location: Left arm, Patient Position: Sitting, Cuff Size: Standard)   Ht 5' 4\" (1.626 m)   Wt 52.2 kg (115 lb)   BMI 19.74 kg/m²   Physical Exam  Vitals and nursing note reviewed.   Constitutional:       General: She is not in acute distress.     Appearance: Normal appearance.   HENT:      Head: Normocephalic.   Eyes:      Conjunctiva/sclera: Conjunctivae normal.   Cardiovascular:      Rate and Rhythm: Normal rate and regular rhythm.      Heart sounds: Normal heart sounds.   Pulmonary:      Effort: Pulmonary effort is normal.      Breath sounds: Normal breath sounds.   Abdominal:      General: Abdomen is flat.      Palpations: Abdomen is soft.      Tenderness: There is no right CVA tenderness or left CVA tenderness.   Musculoskeletal:         General: Normal range of motion.      Cervical back: Normal range of motion.      Right lower leg: No edema.      Left lower leg: No edema.   Lymphadenopathy:      Cervical: No cervical adenopathy.   Skin:     General: Skin is warm and dry.   Neurological:      Mental Status: She is alert and oriented to person, place, and time.   Psychiatric:         Mood and Affect: Mood normal.         Behavior: Behavior normal.         Thought Content: Thought content normal.         Judgment: Judgment normal.           Assessment/Plan     18 y.o., starting Ortho-Evra " patches weekly, no contraindications.     Problem List Items Addressed This Visit    None  Visit Diagnoses       Encounter for initial prescription of transdermal patch hormonal contraceptive device    -  Primary    Relevant Medications    norelgestromin-ethinyl estradiol (ORTHO EVRA) 150-35 MCG/24HR            Time spent counseling 20 minutes    Contraceptive options were reviewed at previous appointment. She is most interested in the combination patch. Counseled patient on mechanisms, risks, benefits, and side effects. Encouraged patient to change weekly to different locations on clean/dry skin. Patient verbalized understanding; addressed all questions and concerns.    RTO in 3 months for BC check.

## 2024-05-24 ENCOUNTER — OFFICE VISIT (OUTPATIENT)
Dept: OBGYN CLINIC | Facility: CLINIC | Age: 19
End: 2024-05-24
Payer: MEDICARE

## 2024-05-24 VITALS
BODY MASS INDEX: 19.63 KG/M2 | DIASTOLIC BLOOD PRESSURE: 70 MMHG | SYSTOLIC BLOOD PRESSURE: 102 MMHG | HEIGHT: 64 IN | WEIGHT: 115 LBS

## 2024-05-24 DIAGNOSIS — Z30.016 ENCOUNTER FOR INITIAL PRESCRIPTION OF TRANSDERMAL PATCH HORMONAL CONTRACEPTIVE DEVICE: Primary | ICD-10-CM

## 2024-05-24 PROCEDURE — 99213 OFFICE O/P EST LOW 20 MIN: CPT

## 2024-05-24 RX ORDER — NORELGESTROMIN AND ETHINYL ESTRADIOL 35; 150 UG/MG; UG/MG
1 PATCH TRANSDERMAL WEEKLY
Qty: 9 PATCH | Refills: 0 | Status: SHIPPED | OUTPATIENT
Start: 2024-05-24